# Patient Record
Sex: FEMALE | Race: WHITE | NOT HISPANIC OR LATINO | Employment: FULL TIME | URBAN - METROPOLITAN AREA
[De-identification: names, ages, dates, MRNs, and addresses within clinical notes are randomized per-mention and may not be internally consistent; named-entity substitution may affect disease eponyms.]

---

## 2024-04-24 ENCOUNTER — OFFICE VISIT (OUTPATIENT)
Dept: OBGYN CLINIC | Facility: CLINIC | Age: 29
End: 2024-04-24
Payer: COMMERCIAL

## 2024-04-24 VITALS
DIASTOLIC BLOOD PRESSURE: 74 MMHG | SYSTOLIC BLOOD PRESSURE: 114 MMHG | HEIGHT: 66 IN | WEIGHT: 147 LBS | BODY MASS INDEX: 23.63 KG/M2

## 2024-04-24 DIAGNOSIS — N97.9 INFERTILITY, FEMALE: ICD-10-CM

## 2024-04-24 DIAGNOSIS — Z01.419 ENCOUNTER FOR GYNECOLOGICAL EXAMINATION WITHOUT ABNORMAL FINDING: Primary | ICD-10-CM

## 2024-04-24 PROCEDURE — 99385 PREV VISIT NEW AGE 18-39: CPT | Performed by: NURSE PRACTITIONER

## 2024-04-24 NOTE — PROGRESS NOTES
"Assessment/Plan:  Reviewed normal cycles 21-45 days Attempting conception x 1 year Recent labs WNL recommend semen analysis for  Consult with DALTON info provided for [place] and [facility]. Discussed AP natural cycles. Cont ovulation predictor kits Basal temp check        Diagnoses and all orders for this visit:    Encounter for gynecological examination without abnormal finding    Infertility, female    Other orders  -     Liquid-based pap, screening          Subjective:      Patient ID: Fady Molina is a 29 y.o. female.    New pt here for annual gyn Last [place] 4/2023 PAP neg/neg HPV Labs done 3/2024 for fertility testing WNL Insulin slightly elevated. Had Kyleena x 5 years removed 3/2023 prev on OCP Periods with  Spotting starts day 21-27 days and bleeds 2-7 days.  Has used ovulation predictor kits with positive result.  has not had semen analysis Denies abd or pelvic pain  bowel and bladder are normal         The following portions of the patient's history were reviewed and updated as appropriate: allergies, current medications, past family history, past medical history, past social history, past surgical history, and problem list.    Review of Systems   Constitutional:  Negative for fatigue and unexpected weight change.   Gastrointestinal:  Negative for abdominal distention, abdominal pain, constipation and diarrhea.   Genitourinary:  Positive for menstrual problem. Negative for difficulty urinating, dyspareunia, dysuria, frequency, genital sores, pelvic pain, urgency, vaginal bleeding, vaginal discharge and vaginal pain.   Neurological:  Negative for headaches.   Psychiatric/Behavioral: Negative.  Negative for dysphoric mood. The patient is not nervous/anxious.          Objective:      /74 (BP Location: Right arm, Patient Position: Sitting, Cuff Size: Standard)   Ht 5' 5.5\" (1.664 m)   Wt 66.7 kg (147 lb)   LMP 04/07/2024   BMI 24.09 kg/m²          Physical Exam  Vitals and nursing " note reviewed.   Constitutional:       General: She is not in acute distress.     Appearance: Normal appearance.   HENT:      Head: Normocephalic and atraumatic.   Pulmonary:      Effort: Pulmonary effort is normal.   Chest:   Breasts:     Breasts are symmetrical.      Right: Normal. No mass, nipple discharge, skin change or tenderness.      Left: Normal. No mass, nipple discharge, skin change or tenderness.   Abdominal:      General: There is no distension.      Palpations: Abdomen is soft.      Tenderness: There is no abdominal tenderness. There is no guarding or rebound.   Genitourinary:     General: Normal vulva.      Exam position: Lithotomy position.      Labia:         Right: No rash, tenderness, lesion or injury.         Left: No rash, tenderness, lesion or injury.       Urethra: No prolapse, urethral pain, urethral swelling or urethral lesion.      Vagina: Normal. No erythema or lesions.      Cervix: No cervical motion tenderness, discharge, lesion or cervical bleeding.      Uterus: Normal.       Adnexa: Right adnexa normal and left adnexa normal.        Right: No mass or tenderness.          Left: No mass or tenderness.        Rectum: No mass or external hemorrhoid.   Musculoskeletal:         General: Normal range of motion.   Lymphadenopathy:      Upper Body:      Right upper body: No axillary adenopathy.      Left upper body: No axillary adenopathy.      Lower Body: No right inguinal adenopathy. No left inguinal adenopathy.   Skin:     General: Skin is warm and dry.   Neurological:      Mental Status: She is alert and oriented to person, place, and time.   Psychiatric:         Mood and Affect: Mood normal.         Behavior: Behavior normal.         Thought Content: Thought content normal.         Judgment: Judgment normal.

## 2024-05-02 NOTE — PATIENT INSTRUCTIONS
Reviewed normal cycles 21-45 days Attempting conception x 1 year Recent labs WNL recommend semen analysis for  Consult with DALTON info provided for [place] and [facility]. Discussed AP natural cycles. Cont ovulation predictor kits Basal temp check

## 2024-05-28 DIAGNOSIS — N92.6 IRREGULAR MENSES: Primary | ICD-10-CM

## 2024-05-28 DIAGNOSIS — N93.0 PCB (POST COITAL BLEEDING): ICD-10-CM

## 2024-07-02 ENCOUNTER — HOSPITAL ENCOUNTER (OUTPATIENT)
Dept: ULTRASOUND IMAGING | Facility: HOSPITAL | Age: 29
Discharge: HOME | End: 2024-07-02
Payer: COMMERCIAL

## 2024-07-02 DIAGNOSIS — N92.6 IRREGULAR MENSES: ICD-10-CM

## 2024-07-02 DIAGNOSIS — N93.0 PCB (POST COITAL BLEEDING): ICD-10-CM

## 2024-07-02 PROCEDURE — 76856 US EXAM PELVIC COMPLETE: CPT

## 2024-07-02 PROCEDURE — 76830 TRANSVAGINAL US NON-OB: CPT

## 2024-09-10 ENCOUNTER — ULTRASOUND (OUTPATIENT)
Dept: OBGYN CLINIC | Facility: CLINIC | Age: 29
End: 2024-09-10
Payer: COMMERCIAL

## 2024-09-10 VITALS
HEIGHT: 65 IN | DIASTOLIC BLOOD PRESSURE: 68 MMHG | WEIGHT: 150 LBS | BODY MASS INDEX: 24.99 KG/M2 | SYSTOLIC BLOOD PRESSURE: 110 MMHG

## 2024-09-10 DIAGNOSIS — Z3A.09 9 WEEKS GESTATION OF PREGNANCY: ICD-10-CM

## 2024-09-10 DIAGNOSIS — N91.2 AMENORRHEA: Primary | ICD-10-CM

## 2024-09-10 PROBLEM — N92.6 IRREGULAR MENSES: Status: ACTIVE | Noted: 2024-09-10

## 2024-09-10 PROBLEM — G43.109 MIGRAINE WITH AURA: Status: ACTIVE | Noted: 2024-09-10

## 2024-09-10 PROCEDURE — 99213 OFFICE O/P EST LOW 20 MIN: CPT | Performed by: PHYSICIAN ASSISTANT

## 2024-09-10 PROCEDURE — 76817 TRANSVAGINAL US OBSTETRIC: CPT | Performed by: PHYSICIAN ASSISTANT

## 2024-09-10 NOTE — PROGRESS NOTES
"Pregnancy Confirmation Visit  [facility] [place]  [address], [address], [place] [address]    Assessment/Plan:  29 y.o.  presenting with missed menses.  Viable pregnancy 9w2d by ultrasound today. Irregular menses 21-24 days apart. Will date based on ultrasound.   - Continue/start prenatal vitamin  - We reviewed her current medications and discussed which are safe to continue in pregnancy  - We briefly discussed options for aneuploidy screening, to be discussed further at the prenatal intake, MFM referral given.   - Schedule prenatal intake with RN and initial prenatal visit; prenatal labs will be ordered during the prenatal intake    Additional Pregnancy Problems Addressed today:   1. Amenorrhea  -     AMB US OB < 14 weeks single or first gestation level 1  2. 9 weeks gestation of pregnancy  -     Ambulatory Referral to Maternal Fetal Medicine; Future; Expected date: 2024        Subjective:    CC: Missed period    Fady Molina is a 29 y.o.  who presents with missed menses.  Patient's last menstrual period was 2024 (approximate)..    Patient notes that this pregnancy was planned and desired.  She was not using contraception at the time of conception. She reports she is certain of her LMP and that she has irregular menses, approximately q21-24 days. She has has no vaginal bleeding since her LMP.    Objective:  /68 (BP Location: Left arm, Patient Position: Sitting, Cuff Size: Standard)   Ht 5' 5\" (1.651 m)   Wt 68 kg (150 lb)   LMP 2024 (Approximate)   BMI 24.96 kg/m²     Physical Exam:  General: Well appearing, no distress  CV: Regular rate  Respiratory: Unlabored breathing  Abdomen: Soft, nontender  Extremities: Without edema  Mood and Affect: Appropriate    FIRST TRIMESTER OBSTETRIC ULTRASOUND  Date of study: 9/10/2024  Performed by: Vanda Sparks PA-C     INDICATION: Amenorrhea, viability    COMPARISON: None.     TECHNIQUE:   " Transvaginal imaging was performed to assess the gestation, myometrial/endometrial architecture and ovarian parenchymal detail.    The study includes volumetric sweeps and traditional still imaging technique.      FINDINGS:     A single intrauterine gestation is identified.  Cardiac activity is detected at 175.      YOLK SAC:  Present and normal in size and appearance.  MEAN CROWN RUMP LENGTH:  26.1 mm = 9 weeks 2 days   AMNIOTIC FLUID/SAC SHAPE:  Within expected normal range.     UTERUS/ADNEXA:   No adnexal mass or pathologic cyst.  No free fluid identified.     IMPRESSION:    Will assign dating based on ultrasound at today's encounter  Final JADA: 4/13/2025  Gestational age: 9w2d  Fetal cardiac activity detected.  No adnexal masses seen.    Vanda Sparks PA-C  9/10/2024 8:32 AM       Additional Findings: none     FHR: 175    Assigning a Final JADA  Patient's menses are irregular 21-24 days apart. Will date based on ultrasound at today's encounter.     Final JADA: 4/13/2025 by ultrasound at today's encounter.         Vanda Sparks PA-C  [facility] [address] [address]  Dept: [phone]  Dept Fax: [phone]  Loc Appt: [phone]  Loc: [phone]  Loc Fax: [phone]

## 2024-09-10 NOTE — PROGRESS NOTES
Ultrasound Probe Disinfection    A transvaginal ultrasound was performed.   Prior to use, disinfection was performed with High Level Disinfection Process ([employer])  Probe serial number [id number]:  [id number] was used    Carmina Sandoval  09/10/24  8:40 AM

## 2024-09-23 NOTE — PATIENT INSTRUCTIONS
Miles Molina,     It was so nice getting to know you today. Remember if you have an urgent or time sensitive concern, please call the practice phone number so a clinical triage team member can review your symptoms and get in touch with our on call provider if necessary. If you have general questions or need help navigating our services please REPLY to this message so it comes directly to me and I will respond between other patients. If I am out of the office for any reason, another nurse navigator may reach out to help you. Our Pregnancy Essential Guide is a great online resource--please use the link below.     [facility] Pregnancy Essentials Guide  [facility] ([email or web address])     Again, Congratulations and Thank You for choosing [facility]. I look forward to helping you through this journey.

## 2024-09-23 NOTE — PROGRESS NOTES
OB INTAKE INTERVIEW  Patient is 29 y.o. who presents for OB intake at 11.4wks  She is accompanied by  during this encounter  The father of her baby (Camacho) is involved in the pregnancy and is 32 years old.      Last Menstrual Period: 2024 (approximate)  Reports menstrual cycles are irregular, every 21-24 days apart   Ultrasound: Measured 9 weeks 2 days on 09/10/2024  Estimated Date of Delivery: 2024 based upon Ultrasound 9/10/24    Signs/Symptoms of Pregnancy  Current pregnancy symptoms:  Nausea: Try to eat 5-6 small meals a day, increase protein with meals/snacks, in order to keep stomach full at all times. Try bland foods like plain crackers, toast as well as carbonated drinks like gingerale. Hard peppermint or kush candy, is a natural treatment for nausea,  B-tahmina pops  with B6 ( available at the pharmacy), B6 25 mg in the AM and 25 mg in PM. May combine with Unisom 12.5 mg at night. Unisom may cause drowsiness.  High complex carbohydrate snack  before bedtime. If symptoms worsen, unable to keep fluids down without vomiting for more than 12 hours, contact the office.   Constipation no  Headaches YES, prior to pregnancy drinking 3-4 cups caffeine daily. Recommend Tylenol with caffeine PRN for first line headache treatment in pregnancy. Discussed may consider initiation of Riboflavin (VIT B2) 400 mg daily with Magnesium Oxide 400 mg daily for migraine prevention. Also encourage adequate hydration.     Cramping/spotting no  PICA cravings no    Diabetes-  There is no height or weight on file to calculate BMI.  If patient has 1 or more, please order early 1 hour GTT  History of GDM no  BMI >35 no  History of PCOS or current metformin use no  History of LGA/macrosomic infant (4000g/9lbs) no    If patient has 2 or more, please order early 1 hour GTT  BMI>30 no  AMA no  First degree relative with type 2 diabetes no  History of chronic HTN, hyperlipidemia, elevated A1C no  High risk race (  American, , ,  or ) no    Hypertension- if you answer yes to any of the following, please order baseline preeclampsia labs (cbc, comprehensive metabolic panel, urine protein creatinine ratio, uric acid)  History of of chronic HTN no  History of gestational HTN no  History of preeclampsia, eclampsia, or HELLP syndrome no  History of diabetes no  History of lupus, autoimmune disease, kidney disease no    Thyroid- if yes order TSH with reflex T4  History of thyroid disease no    Bleeding Disorder or Hx of DVT-patient or first degree relative with history of. Order the following if not done previously.   (Factor V, antithrombin III, prothrombin gene mutation, protein C and S Ag, lupus anticoagulant, anticardiolipin, beta-2 glycoprotein)   no    OB/GYN-  History of abnormal pap smear no       Date of last pap smear 2023  History of HPV no  History of Herpes/HSV no  History of other STI (gonorrhea, chlamydia, trich) no  History of prior  no  History of prior  no  History of  delivery prior to 36 weeks 6 days no  History of blood transfusion no  Ok for blood transfusion yes     Substance screening-   History of tobacco use no  Currently using tobacco no  Substance Use Screen Level No Risk     MRSA Screening-   Does the pt have a hx of MRSA? no    Immunizations:  Influenza vaccine given this seasonNO  Discussed Tdap vaccine yes  Discussed COVID Vaccine yes    Genetic/MFM-  Do you or your partner have a history of any of the following in yourselves or first degree relatives?  Cystic fibrosis no  Spinal muscular atrophy no  Hemoglobinopathy/Sickle Cell/Thalassemia no  Fragile X Intellectual Disability no    If yes, discuss Carrier Screening and recommend consultation with MFM/Genetic Counseling and place specific MFM Referral for.    If no, discuss Carrier Screening being completed once in a lifetime as a standard of care lab test. Place orders for Cystic  Fibrosis Gene Test (MSS021) and Spinal Muscular Atrophy DNA (NXP5926)  CF/SMA cost option sheet for [facility] provided, pt will verify coverage and call if she desires to have carrier screening     Appointment for Nuchal Translucency Ultrasound at [facility] scheduled for 10/2/2024      Interview education  [facility] Pregnancy Essentials Book reviewed, discussed and attached to their AVS yes      Ambulatory Referral to  placed  EPDS: 0    Prenatal lab work scripts YES  Preferred Lab: [facility]  Extra labs ordered:  none    Aspirin/Preeclampsia Screen    Risk Level Risk Factor Recommendation   LOW Prior Uncomplicated full-term delivery no No Aspirin recommendation        MODERATE Nulliparity YES Recommend low-dose aspirin if     BMI>30 no 2 or more moderate risk factors    Family History Preeclampsia (mother/sister) no     35yr old or greater no     Black Race, Concern for SDOH/Low Socioeconomic no     IVF Pregnancy  no     Personal History Risks (low birth weight, prior adverse preg outcome, >10yr preg interval) no         HIGH History of Preeclampsia no Recommend low-dose aspirin if     Multifetal gestation no 1 or more high risk factors    Chronic HTN no     Type 1 or 2 Diabetes no     Renal Disease no     Autoimmune Disease  no      Contraindications to ASA therapy:  NSAID/ ASA allergy: no  Nasal polyps: no  Asthma with history of ASA induced bronchospasm: no  Relative contraindications:  History of GI bleed: no  Active peptic ulcer disease: no  Severe hepatic dysfunction: no    Patient should be recommended to take ASA 162mg during this pregnancy from 12-36wks to lower her risk of preeclampsia:   Low Risk       The patient has a history now or in prior pregnancy notable for:  See Below      Details that I feel the provider should be aware of: Fady is a current patient to [facility]. This is her first pregnancy. This pregnancy was planned and desired.    Medical history includes :  Migraines  without aura-Relieved with Chiropractor adjusments   Denies any other past or current medical concerns.      *Has a cat-toxoplasmosis precautions provided     PN1 visit scheduled. The patient was oriented to our practice, the navigator role, reviewed delivering physicians and [facility] for Delivery. All questions were answered.    Interviewed by: MADELIN Gonzalez

## 2024-09-26 ENCOUNTER — PATIENT OUTREACH (OUTPATIENT)
Dept: OBGYN CLINIC | Facility: CLINIC | Age: 29
End: 2024-09-26

## 2024-09-26 ENCOUNTER — INITIAL PRENATAL (OUTPATIENT)
Dept: OBGYN CLINIC | Facility: CLINIC | Age: 29
End: 2024-09-26

## 2024-09-26 VITALS
WEIGHT: 150 LBS | HEIGHT: 65 IN | DIASTOLIC BLOOD PRESSURE: 82 MMHG | SYSTOLIC BLOOD PRESSURE: 122 MMHG | BODY MASS INDEX: 24.99 KG/M2

## 2024-09-26 DIAGNOSIS — Z36.89 ENCOUNTER FOR OTHER SPECIFIED ANTENATAL SCREENING: ICD-10-CM

## 2024-09-26 DIAGNOSIS — Z3A.11 11 WEEKS GESTATION OF PREGNANCY: Primary | ICD-10-CM

## 2024-09-26 PROCEDURE — OBC

## 2024-09-26 NOTE — PROGRESS NOTES
SW referred for initial prenatal assessment. Patient is , 35a0sHQ with an JADA of 25. Patient presented with FOB ( Camacho) today, agreeable to speaking with SW.    Patient reports this is a planned pregnancy. She and FOB both work and drive. She denies needing information for MA/WIC/SNAP, parenting education, or baby supply resources today. Expressed interest in ACP info - 5 Wishes sent via email.    Patient denies current or h/o mental health, substance use, DV/IPV, CYS involvement, and legal concerns. She has good support from FOB their parents. Enjoys watching TV, reading books, and exercising for stress relief.    No SW needs identified at this time, SW closing referral. Please re-refer as needed.

## 2024-09-27 ENCOUNTER — TELEPHONE (OUTPATIENT)
Age: 29
End: 2024-09-27

## 2024-09-27 DIAGNOSIS — Z31.430 ENCOUNTER OF FEMALE FOR TESTING FOR GENETIC DISEASE CARRIER STATUS FOR PROCREATIVE MANAGEMENT: Primary | ICD-10-CM

## 2024-09-27 LAB
EXTERNAL ANTIBODY SCREEN: NORMAL
EXTERNAL HEMATOCRIT: 40.3 %
EXTERNAL HEMOGLOBIN: 13.3 G/DL
EXTERNAL HEPATITIS B SURFACE ANTIGEN: NORMAL
EXTERNAL HIV-1/2 AB-AG: NORMAL
EXTERNAL PLATELET COUNT: 261 K/ÂΜL
EXTERNAL RH FACTOR: POSITIVE
EXTERNAL RUBELLA IGG QUANTITATION: NORMAL
EXTERNAL SYPHILIS TOTAL IGG/IGM SCREENING: NORMAL

## 2024-09-27 NOTE — TELEPHONE ENCOUNTER
Patient had called wanting to add a CF/SMA panel to her prenatal labs that she was already ordered. Patient uses [facility] and can be reached at [phone] with any questions or once the orders are placed.

## 2024-09-27 NOTE — TELEPHONE ENCOUNTER
CF/SMA screening orders placed. Spoke with patient and informed order have been placed and transmitted to [facility]. Pt informed she verified coverage with her insurance.

## 2024-09-29 LAB
ABO GROUP BLD: ABNORMAL
APPEARANCE UR: CLEAR
BACTERIA UR CULT: ABNORMAL
BACTERIA UR CULT: NO GROWTH
BACTERIA URNS QL MICRO: ABNORMAL
BASOPHILS # BLD AUTO: 0 X10E3/UL (ref 0–0.2)
BASOPHILS NFR BLD AUTO: 0 %
BILIRUB UR QL STRIP: NEGATIVE
BLD GP AB SCN SERPL QL: NEGATIVE
C TRACH RRNA SPEC QL NAA+PROBE: NEGATIVE
CASTS URNS QL MICRO: ABNORMAL /LPF
COLOR UR: YELLOW
EOSINOPHIL # BLD AUTO: 0.1 X10E3/UL (ref 0–0.4)
EOSINOPHIL NFR BLD AUTO: 2 %
EPI CELLS #/AREA URNS HPF: ABNORMAL /HPF (ref 0–10)
ERYTHROCYTE [DISTWIDTH] IN BLOOD BY AUTOMATED COUNT: 12.1 % (ref 11.7–15.4)
GLUCOSE UR QL: NEGATIVE
HBV SURFACE AG SERPL QL IA: NEGATIVE
HCT VFR BLD AUTO: 40.3 % (ref 34–46.6)
HCV AB S/CO SERPL IA: NON REACTIVE
HGB BLD-MCNC: 13.3 G/DL (ref 11.1–15.9)
HGB UR QL STRIP: NEGATIVE
HIV 1+2 AB+HIV1 P24 AG SERPL QL IA: NON REACTIVE
IMM GRANULOCYTES # BLD: 0 X10E3/UL (ref 0–0.1)
IMM GRANULOCYTES NFR BLD: 0 %
KETONES UR QL STRIP: NEGATIVE
LEUKOCYTE ESTERASE UR QL STRIP: ABNORMAL
LYMPHOCYTES # BLD AUTO: 1.8 X10E3/UL (ref 0.7–3.1)
LYMPHOCYTES NFR BLD AUTO: 20 %
MCH RBC QN AUTO: 29.8 PG (ref 26.6–33)
MCHC RBC AUTO-ENTMCNC: 33 G/DL (ref 31.5–35.7)
MCV RBC AUTO: 90 FL (ref 79–97)
MICRO URNS: ABNORMAL
MONOCYTES # BLD AUTO: 0.5 X10E3/UL (ref 0.1–0.9)
MONOCYTES NFR BLD AUTO: 6 %
N GONORRHOEA RRNA SPEC QL NAA+PROBE: NEGATIVE
NEUTROPHILS # BLD AUTO: 6.5 X10E3/UL (ref 1.4–7)
NEUTROPHILS NFR BLD AUTO: 72 %
NITRITE UR QL STRIP: NEGATIVE
PH UR STRIP: 5.5 [PH] (ref 5–7.5)
PLATELET # BLD AUTO: 261 X10E3/UL (ref 150–450)
PROT UR QL STRIP: NEGATIVE
RBC # BLD AUTO: 4.47 X10E6/UL (ref 3.77–5.28)
RBC #/AREA URNS HPF: ABNORMAL /HPF (ref 0–2)
RH BLD: POSITIVE
RPR SER QL: NON REACTIVE
RUBV IGG SERPL IA-ACNC: 3.2 INDEX
SL AMB INTERPRETATION: NORMAL
SP GR UR: 1.01 (ref 1–1.03)
UROBILINOGEN UR STRIP-ACNC: 0.2 MG/DL (ref 0.2–1)
WBC # BLD AUTO: 8.9 X10E3/UL (ref 3.4–10.8)
WBC #/AREA URNS HPF: ABNORMAL /HPF (ref 0–5)

## 2024-10-01 NOTE — PROGRESS NOTES
Please refer to the [facility] ultrasound report in Ob Procedures for additional information regarding today's visit

## 2024-10-02 ENCOUNTER — ROUTINE PRENATAL (OUTPATIENT)
Dept: PERINATAL CARE | Facility: OTHER | Age: 29
End: 2024-10-02
Payer: COMMERCIAL

## 2024-10-02 ENCOUNTER — INITIAL PRENATAL (OUTPATIENT)
Dept: OBGYN CLINIC | Facility: CLINIC | Age: 29
End: 2024-10-02
Payer: COMMERCIAL

## 2024-10-02 VITALS
BODY MASS INDEX: 24.56 KG/M2 | DIASTOLIC BLOOD PRESSURE: 76 MMHG | HEIGHT: 65 IN | SYSTOLIC BLOOD PRESSURE: 120 MMHG | WEIGHT: 147.4 LBS

## 2024-10-02 VITALS
BODY MASS INDEX: 24.69 KG/M2 | DIASTOLIC BLOOD PRESSURE: 78 MMHG | HEIGHT: 65 IN | WEIGHT: 148.2 LBS | SYSTOLIC BLOOD PRESSURE: 126 MMHG | HEART RATE: 100 BPM

## 2024-10-02 DIAGNOSIS — Z33.1 PREGNANT STATE, INCIDENTAL: ICD-10-CM

## 2024-10-02 DIAGNOSIS — Z3A.09 9 WEEKS GESTATION OF PREGNANCY: ICD-10-CM

## 2024-10-02 DIAGNOSIS — Z36.82 ENCOUNTER FOR ANTENATAL SCREENING FOR NUCHAL TRANSLUCENCY: Primary | ICD-10-CM

## 2024-10-02 DIAGNOSIS — Z36.0 ENCOUNTER FOR ANTENATAL SCREENING FOR CHROMOSOMAL ANOMALIES: ICD-10-CM

## 2024-10-02 DIAGNOSIS — Z3A.12 12 WEEKS GESTATION OF PREGNANCY: ICD-10-CM

## 2024-10-02 DIAGNOSIS — Z11.3 SCREEN FOR STD (SEXUALLY TRANSMITTED DISEASE): ICD-10-CM

## 2024-10-02 DIAGNOSIS — Z3A.12 12 WEEKS GESTATION OF PREGNANCY: Primary | ICD-10-CM

## 2024-10-02 LAB
EXTERNAL CHLAMYDIA SCREEN: NEGATIVE
EXTERNAL GONORRHEA SCREEN: NEGATIVE
SL AMB  POCT GLUCOSE, UA: NORMAL
SL AMB POCT URINE PROTEIN: NORMAL

## 2024-10-02 PROCEDURE — 76813 OB US NUCHAL MEAS 1 GEST: CPT | Performed by: OBSTETRICS & GYNECOLOGY

## 2024-10-02 PROCEDURE — 76801 OB US < 14 WKS SINGLE FETUS: CPT | Performed by: OBSTETRICS & GYNECOLOGY

## 2024-10-02 PROCEDURE — 99203 OFFICE O/P NEW LOW 30 MIN: CPT | Performed by: OBSTETRICS & GYNECOLOGY

## 2024-10-02 PROCEDURE — 36415 COLL VENOUS BLD VENIPUNCTURE: CPT | Performed by: OBSTETRICS & GYNECOLOGY

## 2024-10-02 PROCEDURE — PNV: Performed by: OBSTETRICS & GYNECOLOGY

## 2024-10-02 PROCEDURE — 81002 URINALYSIS NONAUTO W/O SCOPE: CPT | Performed by: OBSTETRICS & GYNECOLOGY

## 2024-10-02 NOTE — PROGRESS NOTES
First OB Visit  [facility] - [address], [address], [place] [address]    Assessment/Plan:  Fady is a 29 y.o. year old  at 12w3d who presents for initial prenatal visit.   Final JADA: 2025, by Ultrasound      Supervision of normal pregnancy  - Aneuploidy screening discussed.  Patient opts for sequential aneuploidy screening.  - Routine cervical cancer screening: Pap Done today.  - Routine STI Screening: GC/Chlamydia sent today.  HIV/Hep B/Hep C/Syphilis ordered in prenatal panel.  - Patient Education: Patient was counseled regarding diet, exercise, weight gain, foods to avoid, vaccines in pregnancy, aneuploidy screening, travel precautions to include seat belt use and VTE risk reduction.  She has been provided our pregnancy packet which includes how and when to contact providers, medication recommendations, dietary suggestions, breastfeeding information as well as websites for additional information, hospital and delivery concerns.         Additional Pregnancy Problems:   1. 12 weeks gestation of pregnancy  -     POCT urine dip  2. Screen for STD (sexually transmitted disease)  -     Chlamydia/GC EDGAR, Confirmation      MFM referral given for early anatomy and aneuploidy screening, due 11-13 weeks.    Next OB visit: 4 wks    Subjective:   CC:  Desires prenatal care  Fady Molina is a 29 y.o.  female who presents for prenatal care.  Patient's last menstrual period was 2024 (approximate).  Which would make her 12 weeks and 3 days pregnant today.    Pregnancy ROS: no leakage of fluid, pelvic pain, or vaginal bleeding.  no nausea/vomiting.    OB History    Para Term  AB Living   1 0 0 0 0 0   SAB IAB Ectopic Multiple Live Births   0 0 0 0 0      # Outcome Date GA Lbr Joaquin/2nd Weight Sex Type Anes PTL Lv   1 Current                The following portions of the patient's history were reviewed and updated as appropriate: She  has a past medical  "history of Migraines.  She  has a past surgical history that includes [place] tooth extraction.  Her family history includes Lung cancer in her maternal grandmother.  She  reports that she has never smoked. She has never been exposed to tobacco smoke. She has never used smokeless tobacco. She reports that she does not currently use alcohol. She reports that she does not use drugs.  Current Outpatient Medications   Medication Sig Dispense Refill    Vyhadn-Qsazwqypx-Cste-Fish Oil (PRENATAL + COMPLETE MULTI PO) Take by mouth       No current facility-administered medications for this visit.     She is allergic to pollen extract..      Objective:  /76 (BP Location: Left arm, Patient Position: Sitting, Cuff Size: Standard)   Ht 5' 5\" (1.651 m)   Wt 66.9 kg (147 lb 6.4 oz)   LMP 2024 (Approximate)   BMI 24.53 kg/m²   Pregravid Weight/BMI: 65.8 kg (145 lb) (BMI 24.13)  Current Weight: 66.9 kg (147 lb 6.4 oz)   Total Weight Gain: 1.089 kg (2 lb 6.4 oz)   Pre- Vitals      Flowsheet Row Most Recent Value   Prenatal Assessment    Fetal Heart Rate 150   Fundal Height (cm) 12 cm   Prenatal Vitals    Blood Pressure 120/76   Weight - Scale 66.9 kg (147 lb 6.4 oz)   Urine Albumin/Glucose    Dilation/Effacement/Station    Cervical Dilation 0   Cervical Effacement 0   Fetal Station -5   Vaginal Drainage    Draining Fluid No   Edema            General: Well appearing, no distress  Breasts: Normal bilaterally, nontender without masses, asymmetry, or nipple discharge.  Abdomen: Soft, gravid, nontender  : Urethra normal. Normal labia majora and minora. Vagina normal.  No vaginal bleeding. No vaginal discharge. Cervix closed. Uterus non-tender.  Extremities: Warm and well perfused.  Non tender.  No edema.    Ultrasound: ultrasound confirmed SLIUP, see report for details  "

## 2024-10-02 NOTE — PROGRESS NOTES
Patient chose to have [facility] [id number] Non-Invasive Prenatal Screen [id number] PLUS w/ SCA, WITH fetal sex.  Patient choose to be billed through insurance.     Patient given brochure and is aware [facility] will contact patient's insurance and coordinate coverage.  Provided [facility] contact information. General inquiries [phone], Cost estimates [phone] and [facility] Billing [phone]. Website [email or web address].     Blood collection tubes labeled with patient identifiers (name, medical record number, and date of birth).     Filled out [facility] order form. Patient chose to have blood drawn in our office at time of visit. NIPS was drawn from right arm with a butterfly needle by MEERA Vasquez MA. .      If patient chose to have blood work drawn at a [facility] lab we requested patient notify MFM (via phone call or [employer] message) when blood collected so office can follow up on results.       Maternal Fetal Medicine will have results in approximately 5-7 business days and will call patient or notify via [employer].  Patient aware viewing lab result online will reveal fetal sex if ordered.    Patient verbalized understanding of all instructions and no questions at this time.

## 2024-10-06 LAB
CFDNA.FET/CFDNA.TOTAL SFR FETUS: NORMAL %
CITATION REF LAB TEST: NORMAL
FET 13+18+21+X+Y ANEUP PLAS.CFDNA: NEGATIVE
FET CHR 21 TS PLAS.CFDNA QL: NEGATIVE
FET CHR 21 TS PLAS.CFDNA QL: NEGATIVE
FET MS X RISK WBC.DNA+CFDNA QL: NOT DETECTED
FET SEX PLAS.CFDNA DOSAGE CFDNA: NORMAL
FET TS 13 RISK PLAS.CFDNA QL: NEGATIVE
FET X + Y ANEUP RISK PLAS.CFDNA SEQ-IMP: NOT DETECTED
GA EST FROM CONCEPTION DATE: NORMAL D
GESTATIONAL AGE > 9:: YES
LAB DIRECTOR NAME PROVIDER: NORMAL
LAB DIRECTOR NAME PROVIDER: NORMAL
LABORATORY COMMENT REPORT: NORMAL
LIMITATIONS OF THE TEST: NORMAL
NEGATIVE PREDICTIVE VALUE: NORMAL
PERFORMANCE CHARACTERISTICS: NORMAL
POSITIVE PREDICTIVE VALUE: NORMAL
REF LAB TEST METHOD: NORMAL
SL AMB NOTE:: NORMAL
TEST PERFORMANCE INFO SPEC: NORMAL

## 2024-10-07 LAB
C TRACH RRNA SPEC QL NAA+PROBE: NEGATIVE
N GONORRHOEA RRNA SPEC QL NAA+PROBE: NEGATIVE

## 2024-10-16 LAB
CITATION REF LAB TEST: NORMAL
ETHNIC BACKGROUND STATED: NORMAL
GENE DIS ANL CARRIER INTERP-IMP: NORMAL
GENE STUDIED ID: NORMAL
LAB DIRECTOR NAME PROVIDER: NORMAL
LABORATORY COMMENT REPORT: NORMAL
Lab: NORMAL
REASON FOR REFERRAL (NARRATIVE): NORMAL
RECOMMENDATION PATIENT DOC-IMP: NORMAL
REF LAB TEST METHOD: NORMAL
SMN1 GENE MUT ANL BLD/T: NORMAL
SPECIMEN PREPARATION: NORMAL

## 2024-10-29 ENCOUNTER — ROUTINE PRENATAL (OUTPATIENT)
Dept: OBGYN CLINIC | Facility: CLINIC | Age: 29
End: 2024-10-29
Payer: COMMERCIAL

## 2024-10-29 VITALS
BODY MASS INDEX: 25.49 KG/M2 | HEIGHT: 65 IN | DIASTOLIC BLOOD PRESSURE: 76 MMHG | SYSTOLIC BLOOD PRESSURE: 114 MMHG | WEIGHT: 153 LBS

## 2024-10-29 DIAGNOSIS — Z3A.16 16 WEEKS GESTATION OF PREGNANCY: ICD-10-CM

## 2024-10-29 DIAGNOSIS — Z36.1 NEED FOR MATERNAL SERUM ALPHA-PROTEIN (MSAFP) SCREENING: ICD-10-CM

## 2024-10-29 DIAGNOSIS — R51.9 PREGNANCY HEADACHE, ANTEPARTUM: Primary | ICD-10-CM

## 2024-10-29 DIAGNOSIS — O26.899 PREGNANCY HEADACHE, ANTEPARTUM: Primary | ICD-10-CM

## 2024-10-29 LAB
SL AMB  POCT GLUCOSE, UA: NORMAL
SL AMB POCT URINE PROTEIN: NORMAL

## 2024-10-29 PROCEDURE — PNV: Performed by: PHYSICIAN ASSISTANT

## 2024-10-29 PROCEDURE — 81002 URINALYSIS NONAUTO W/O SCOPE: CPT | Performed by: PHYSICIAN ASSISTANT

## 2024-10-29 NOTE — PROGRESS NOTES
"Routine Prenatal Visit  [facility] [place]  [address], [address], [place] [address]    Assessment/Plan:  Fady is a 29 y.o. year old  at 16w2d who presents for routine prenatal visit.     1. Pregnancy headache, antepartum  Assessment & Plan:  Reviewed headaches, recommend Tylenol, rest, hydration. Reviewed Magnesium 250-400mg & Riboflavin 400mg daily.       2. 16 weeks gestation of pregnancy  Assessment & Plan:  Reviewed AFP to evaluate for ONTD. Script given. Aware to have done between 15-18 weeks. Aware to check insurance for coverage prior to having one.   Has Level II ultrasound scheduled.   Continue routine prenatal care.   Return to office for ob check in 4 weeks.       Orders:  -     POCT urine dip  -     Alpha fetoprotein, maternal; Future  -     Alpha fetoprotein, maternal  3. Need for maternal serum alpha-protein (MSAFP) screening  -     Alpha fetoprotein, maternal; Future  -     Alpha fetoprotein, maternal      Next OB Visit 4 weeks.    Subjective:     CC: Prenatal care    Fady Molina is a 29 y.o.  female who presents for routine prenatal care at 16w2d.  Pregnancy ROS:Reports headaches, hx of migraines.  No FM, N/V, cramping, VB, LOF, edema, domestic violence, or smoking. Tolerating PNV.        The following portions of the patient's history were reviewed and updated as appropriate: allergies, current medications, past family history, past medical history, obstetric history, gynecologic history, past social history, past surgical history and problem list.      Objective:  /76 (BP Location: Left arm, Patient Position: Sitting, Cuff Size: Standard)   Ht 5' 5\" (1.651 m)   Wt 69.4 kg (153 lb)   LMP 2024 (Approximate)   BMI 25.46 kg/m²   Pregravid Weight/BMI: 65.8 kg (145 lb) (BMI 24.13)  Current Weight: 69.4 kg (153 lb)   Total Weight Gain: 3.629 kg (8 lb)   Pre- Vitals      Flowsheet Row Most Recent Value   Prenatal Assessment    Fetal " Heart Rate 153   Fundal Height (cm) 16 cm   Movement Absent   Prenatal Vitals    Blood Pressure 114/76   Weight - Scale 69.4 kg (153 lb)   Urine Albumin/Glucose    Dilation/Effacement/Station    Vaginal Drainage    Edema    LLE Edema None   RLE Edema None   Facial Edema None             General: Well appearing, no distress  Abdomen: Soft, gravid, nontender  Fundal Height: Appropriate for gestational age.  Extremities: Warm and well perfused.  Non tender.

## 2024-10-29 NOTE — ASSESSMENT & PLAN NOTE
Reviewed AFP to evaluate for ONTD. Script given. Aware to have done between 15-18 weeks. Aware to check insurance for coverage prior to having one.   Has Level II ultrasound scheduled.   Continue routine prenatal care.   Return to office for ob check in 4 weeks.

## 2024-10-29 NOTE — ASSESSMENT & PLAN NOTE
Reviewed headaches, recommend Tylenol, rest, hydration. Reviewed Magnesium 250-400mg & Riboflavin 400mg daily.

## 2024-11-22 ENCOUNTER — TELEPHONE (OUTPATIENT)
Dept: OBGYN CLINIC | Facility: CLINIC | Age: 29
End: 2024-11-22

## 2024-11-27 ENCOUNTER — ROUTINE PRENATAL (OUTPATIENT)
Dept: OBGYN CLINIC | Facility: CLINIC | Age: 29
End: 2024-11-27
Payer: COMMERCIAL

## 2024-11-27 VITALS
DIASTOLIC BLOOD PRESSURE: 74 MMHG | BODY MASS INDEX: 26.63 KG/M2 | SYSTOLIC BLOOD PRESSURE: 112 MMHG | HEIGHT: 64 IN | WEIGHT: 156 LBS

## 2024-11-27 DIAGNOSIS — Z34.02 ENCOUNTER FOR SUPERVISION OF NORMAL FIRST PREGNANCY IN SECOND TRIMESTER: Primary | ICD-10-CM

## 2024-11-27 DIAGNOSIS — Z3A.20 20 WEEKS GESTATION OF PREGNANCY: ICD-10-CM

## 2024-11-27 LAB
SL AMB  POCT GLUCOSE, UA: NORMAL
SL AMB POCT URINE PROTEIN: NORMAL

## 2024-11-27 PROCEDURE — 81002 URINALYSIS NONAUTO W/O SCOPE: CPT | Performed by: OBSTETRICS & GYNECOLOGY

## 2024-11-27 PROCEDURE — PNV: Performed by: OBSTETRICS & GYNECOLOGY

## 2024-11-27 NOTE — ASSESSMENT & PLAN NOTE
Has not gone for AFP. Explained rationale and she will consider going. Explained time frame for valid testing. Anatomy U/S scheduled for Friday

## 2024-11-27 NOTE — PROGRESS NOTES
"Routine Prenatal Visit  [facility] [address], [address], [place] [address]    Assessment/Plan:  Fady is a 29 y.o. year old  at 20w3d who presents for routine prenatal visit.     1. Encounter for supervision of normal first pregnancy in second trimester  2. 20 weeks gestation of pregnancy  Assessment & Plan:  Has not gone for AFP. Explained rationale and she will consider going. Explained time frame for valid testing. Anatomy U/S scheduled for Friday  Orders:  -     POCT urine dip        Subjective:     CC: Prenatal care    Fady Molina is a 29 y.o.  female who presents for routine prenatal care at 20w3d.  Pregnancy ROS: no leakage of fluid, pelvic pain, or vaginal bleeding.  normal fetal movement.    The following portions of the patient's history were reviewed and updated as appropriate: allergies, current medications, past family history, past medical history, obstetric history, gynecologic history, past social history, past surgical history and problem list.      Objective:  /74 (BP Location: Left arm, Patient Position: Sitting, Cuff Size: Standard)   Ht 5' 4\" (1.626 m)   Wt 70.8 kg (156 lb)   LMP 2024 (Approximate)   BMI 26.78 kg/m²   Pregravid Weight/BMI: 65.8 kg (145 lb) (BMI 24.88)  Current Weight: 70.8 kg (156 lb)   Total Weight Gain: 4.99 kg (11 lb)   Pre-Tracie Vitals      Flowsheet Row Most Recent Value   Prenatal Assessment    Fetal Heart Rate 145   Fundal Height (cm) 20 cm   Movement Present   Prenatal Vitals    Blood Pressure 112/74   Weight - Scale 70.8 kg (156 lb)   Urine Albumin/Glucose    Dilation/Effacement/Station    Vaginal Drainage    Edema              General: Well appearing, no distress  Respiratory: Unlabored breathing  Cardiovascular: Regular rate.  Abdomen: Soft, gravid, nontender  Fundal Height: Appropriate for gestational age.  Extremities: Warm and well perfused.  Non tender.  "

## 2024-11-29 ENCOUNTER — ROUTINE PRENATAL (OUTPATIENT)
Dept: PERINATAL CARE | Facility: OTHER | Age: 29
End: 2024-11-29
Payer: COMMERCIAL

## 2024-11-29 VITALS
HEIGHT: 64 IN | WEIGHT: 156 LBS | DIASTOLIC BLOOD PRESSURE: 84 MMHG | BODY MASS INDEX: 26.63 KG/M2 | SYSTOLIC BLOOD PRESSURE: 118 MMHG | HEART RATE: 82 BPM

## 2024-11-29 DIAGNOSIS — O43.122 VELAMENTOUS INSERTION OF UMBILICAL CORD IN SECOND TRIMESTER: Primary | ICD-10-CM

## 2024-11-29 DIAGNOSIS — Z3A.20 20 WEEKS GESTATION OF PREGNANCY: ICD-10-CM

## 2024-11-29 DIAGNOSIS — Z36.86 ENCOUNTER FOR ANTENATAL SCREENING FOR CERVICAL LENGTH: ICD-10-CM

## 2024-11-29 PROCEDURE — 76817 TRANSVAGINAL US OBSTETRIC: CPT | Performed by: OBSTETRICS & GYNECOLOGY

## 2024-11-29 PROCEDURE — 76811 OB US DETAILED SNGL FETUS: CPT | Performed by: OBSTETRICS & GYNECOLOGY

## 2024-11-29 PROCEDURE — 99213 OFFICE O/P EST LOW 20 MIN: CPT | Performed by: OBSTETRICS & GYNECOLOGY

## 2024-11-29 NOTE — PROGRESS NOTES
Ultrasound Probe Disinfection    A transvaginal ultrasound was performed.   Prior to use, disinfection was performed with High Level Disinfection Process ([email or web address]).  Probe serial number [id number] was used.    Annabelle Davis  11/29/24  2:37 PM

## 2024-11-29 NOTE — LETTER
November 29, 2024     Vanda Sparks PA-C  [address]  [address]  [facility] [address]    Patient: Fady Molina   YOB: 1995   Date of Visit: 11/29/2024       Dear Dr. Sparks:    Thank you for referring Fady Molina to me for evaluation. Below are my notes for this consultation.    If you have questions, please do not hesitate to call me. I look forward to following your patient along with you.         Sincerely,        Maggie Whaley MD        CC: No Recipients    Maggie Whaley MD  11/29/2024  4:59 PM  Sign when Signing Visit  Fady Molina  has no complaints today at 20w5d. She does not report any vaginal bleeding or signs of labor.  Her recently completed fetal testing revealed a normal NIPT.  MSAFP was completed today and is pending. She is here today for an ultrasound for fetal anatomy.    Problem list:  Velamentous placental cord insertion    Ultrasound findings:  The ultrasound today shows normal interval fetal growth and fluid, normal cervical length, and no malformations were detected.  Baby's hand was in the view of the profile which made the views slightly limited but we were able to see a normal chin and normal nasal bone.  The velamentous cord insertion is confirmed and measures about 5 cm from the margin of the placenta.  There is no evidence for a vasa previa.    Pregnancy ultrasound has limitations and is unable to detect all forms of fetal congenital abnormalities.      Specific counseling was provided on the following problems:  Velamentous cord insertion is defined as a placental cord insertion that inserts into the fetal membranes and then the fetal vessels traverses unprotected till it reaches the fetal placenta. This is largely considered an anatomic variant. It is relatively common with an incidence of approximately 1%. We discussed that this has a weak association with fetal growth restriction for which third-trimester growth  ultrasounds are typically recommended.  Recommend also weekly nonstress test and fluid scans from 36 weeks on.  Nonstress test may need to start earlier than 36 weeks if future ultrasounds show any evidence for fetal growth lag.  During delivery sometimes patients with a velamentous cord insertion may have recurrent variable decelerations found during labor that may require a  for the safety of the baby.    Follow up recommended:   Recommended 28-week and 34 week ultrasound for growth.  Will schedule her 28-week ultrasound for growth after todays visit.    Pre visit time reviewing her records   3 minutes  Face to face time 10 minutes  Post visit time on documentation of note, updating her problem list, adding orders and prescriptions 15 minutes.  Procedures that were completed today were charged separately.   The level of decision making was low level complexity.    Maggie Whaley MD

## 2024-11-29 NOTE — PROGRESS NOTES
Fady Molina  has no complaints today at 20w5d. She does not report any vaginal bleeding or signs of labor.  Her recently completed fetal testing revealed a normal NIPT.  MSAFP was completed today and is pending. She is here today for an ultrasound for fetal anatomy.    Problem list:  Velamentous placental cord insertion    Ultrasound findings:  The ultrasound today shows normal interval fetal growth and fluid, normal cervical length, and no malformations were detected.  Baby's hand was in the view of the profile which made the views slightly limited but we were able to see a normal chin and normal nasal bone.  The velamentous cord insertion is confirmed and measures about 5 cm from the margin of the placenta.  There is no evidence for a vasa previa.    Pregnancy ultrasound has limitations and is unable to detect all forms of fetal congenital abnormalities.      Specific counseling was provided on the following problems:  Velamentous cord insertion is defined as a placental cord insertion that inserts into the fetal membranes and then the fetal vessels traverses unprotected till it reaches the fetal placenta. This is largely considered an anatomic variant. It is relatively common with an incidence of approximately 1%. We discussed that this has a weak association with fetal growth restriction for which third-trimester growth ultrasounds are typically recommended.  Recommend also weekly nonstress test and fluid scans from 36 weeks on.  Nonstress test may need to start earlier than 36 weeks if future ultrasounds show any evidence for fetal growth lag.  During delivery sometimes patients with a velamentous cord insertion may have recurrent variable decelerations found during labor that may require a  for the safety of the baby.    Follow up recommended:   Recommended 28-week and 34 week ultrasound for growth.  Will schedule her 28-week ultrasound for growth after todays visit.    Pre visit time  reviewing her records   3 minutes  Face to face time 10 minutes  Post visit time on documentation of note, updating her problem list, adding orders and prescriptions 15 minutes.  Procedures that were completed today were charged separately.   The level of decision making was low level complexity.    Maggie Whaley MD

## 2024-12-01 LAB
2ND TRIMESTER 4 SCREEN SERPL-IMP: NORMAL
AFP ADJ MOM SERPL: 0.74
AFP INTERP AMN-IMP: NORMAL
AFP INTERP SERPL-IMP: NORMAL
AFP INTERP SERPL-IMP: NORMAL
AFP SERPL-MCNC: 44.7 NG/ML
AGE AT DELIVERY: 32.2 YR
GA METHOD: NORMAL
GA: 20.7 WEEKS
IDDM PATIENT QL: NO
MULTIPLE PREGNANCY: NO
NEURAL TUBE DEFECT RISK FETUS: NORMAL %

## 2024-12-02 ENCOUNTER — RESULTS FOLLOW-UP (OUTPATIENT)
Dept: OBGYN CLINIC | Facility: CLINIC | Age: 29
End: 2024-12-02

## 2024-12-20 ENCOUNTER — TELEPHONE (OUTPATIENT)
Dept: OBGYN CLINIC | Facility: CLINIC | Age: 29
End: 2024-12-20

## 2024-12-20 NOTE — TELEPHONE ENCOUNTER
Second Trimester call -Voicemail received, left a message for patient to call back and sent message through [email or web address].     Overall how are you doing?     Compliant with routine OB care appointments? yes  Has ON appointment on 12/24/24  Have you completed your 1st trimester labs? yes    If you had NIPS with MFM, do you have a order for MSAFP?   Done on 11/29/24   Can be completed 15w-22w9d, ideally 16w-18w    Have you seen MFM and do you have your detailed US scheduled? 11/29/24    Pregnancy Education-have you had a chance to review the classes offered and registered? Registered for Prepared Childbirth class 02/03/28

## 2024-12-23 PROBLEM — Z3A.24 24 WEEKS GESTATION OF PREGNANCY: Status: ACTIVE | Noted: 2024-10-29

## 2024-12-24 ENCOUNTER — ROUTINE PRENATAL (OUTPATIENT)
Dept: OBGYN CLINIC | Facility: CLINIC | Age: 29
End: 2024-12-24
Payer: COMMERCIAL

## 2024-12-24 VITALS
WEIGHT: 160 LBS | HEIGHT: 64 IN | DIASTOLIC BLOOD PRESSURE: 80 MMHG | SYSTOLIC BLOOD PRESSURE: 120 MMHG | BODY MASS INDEX: 27.31 KG/M2

## 2024-12-24 DIAGNOSIS — Z3A.24 24 WEEKS GESTATION OF PREGNANCY: ICD-10-CM

## 2024-12-24 DIAGNOSIS — Z36.89 ENCOUNTER FOR OTHER SPECIFIED ANTENATAL SCREENING: ICD-10-CM

## 2024-12-24 DIAGNOSIS — O43.122 VELAMENTOUS INSERTION OF UMBILICAL CORD IN SECOND TRIMESTER: Primary | ICD-10-CM

## 2024-12-24 LAB
SL AMB  POCT GLUCOSE, UA: NORMAL
SL AMB POCT URINE PROTEIN: NORMAL

## 2024-12-24 PROCEDURE — 81002 URINALYSIS NONAUTO W/O SCOPE: CPT | Performed by: PHYSICIAN ASSISTANT

## 2024-12-24 PROCEDURE — PNV: Performed by: PHYSICIAN ASSISTANT

## 2024-12-24 NOTE — PROGRESS NOTES
"Routine Prenatal Visit  [facility] [address], [address], [place] [address]    Assessment/Plan:  Fady is a 29 y.o. year old  at 24w1d who presents for routine prenatal visit.     1. Velamentous insertion of umbilical cord in second trimester  Assessment & Plan:  3rd trimester growth ultrasound scheduled.   Weekly APFS at 36 weeks.   2. 24 weeks gestation of pregnancy  Assessment & Plan:  Script for 28 week labs given.   Continue routine prenatal care.   Return to office for ob check in 4 weeks.     Orders:  -     POCT urine dip  3. Encounter for other specified  screening  -     Glucose, 1H PG; Future  -     CBC; Future  -     RPR, Rfx Qn RPR/Confirm TP; Future  -     Glucose, 1H PG  -     CBC  -     RPR, Rfx Qn RPR/Confirm TP      Next OB Visit 4 weeks.    Subjective:     CC: Prenatal care    Fady Molina is a 29 y.o.  female who presents for routine prenatal care at 24w1d.  Pregnancy ROS: Good Fm, No N/V, HA, cramping, VB, LOF, edema, domestic violence, or smoking. Tolerating PNV.        The following portions of the patient's history were reviewed and updated as appropriate: allergies, current medications, past family history, past medical history, obstetric history, gynecologic history, past social history, past surgical history and problem list.      Objective:  /80 (BP Location: Right arm, Patient Position: Sitting, Cuff Size: Standard)   Ht 5' 4\" (1.626 m)   Wt 72.6 kg (160 lb)   LMP 2024 (Approximate)   BMI 27.46 kg/m²   Pregravid Weight/BMI: 65.8 kg (145 lb) (BMI 24.88)  Current Weight: 72.6 kg (160 lb)   Total Weight Gain: 6.804 kg (15 lb)   Pre-Tracie Vitals      Flowsheet Row Most Recent Value   Prenatal Assessment    Fetal Heart Rate 150   Fundal Height (cm) 24 cm   Movement Present   Prenatal Vitals    Blood Pressure 120/80   Weight - Scale 72.6 kg (160 lb)   Urine Albumin/Glucose    Dilation/Effacement/Station    Vaginal " Drainage    Edema    LLE Edema None   RLE Edema None   Facial Edema None             General: Well appearing, no distress  Abdomen: Soft, gravid, nontender  Fundal Height: Appropriate for gestational age.  Extremities: Warm and well perfused.  Non tender.

## 2024-12-24 NOTE — ASSESSMENT & PLAN NOTE
Script for 28 week labs given.   Continue routine prenatal care.   Return to office for ob check in 4 weeks.

## 2025-01-19 PROBLEM — Z3A.28 28 WEEKS GESTATION OF PREGNANCY: Status: ACTIVE | Noted: 2024-10-29

## 2025-01-20 LAB
EXTERNAL HEMOGLOBIN: 12.3 G/DL
EXTERNAL PLATELET COUNT: 235 K/ÂΜL
EXTERNAL SYPHILIS TOTAL IGG/IGM SCREENING: NORMAL

## 2025-01-21 ENCOUNTER — RESULTS FOLLOW-UP (OUTPATIENT)
Dept: OBGYN CLINIC | Facility: CLINIC | Age: 30
End: 2025-01-21

## 2025-01-21 LAB
ERYTHROCYTE [DISTWIDTH] IN BLOOD BY AUTOMATED COUNT: 13.3 % (ref 11.7–15.4)
GLUCOSE 1H P 50 G GLC PO SERPL-MCNC: 85 MG/DL (ref 70–139)
HCT VFR BLD AUTO: 37.3 % (ref 34–46.6)
HGB BLD-MCNC: 12.3 G/DL (ref 11.1–15.9)
MCH RBC QN AUTO: 30.3 PG (ref 26.6–33)
MCHC RBC AUTO-ENTMCNC: 33 G/DL (ref 31.5–35.7)
MCV RBC AUTO: 92 FL (ref 79–97)
PLATELET # BLD AUTO: 235 X10E3/UL (ref 150–450)
RBC # BLD AUTO: 4.06 X10E6/UL (ref 3.77–5.28)
RPR SER QL: NON REACTIVE
WBC # BLD AUTO: 10.8 X10E3/UL (ref 3.4–10.8)

## 2025-01-22 ENCOUNTER — ROUTINE PRENATAL (OUTPATIENT)
Dept: OBGYN CLINIC | Facility: CLINIC | Age: 30
End: 2025-01-22
Payer: COMMERCIAL

## 2025-01-22 VITALS
HEIGHT: 64 IN | BODY MASS INDEX: 28.17 KG/M2 | WEIGHT: 165 LBS | DIASTOLIC BLOOD PRESSURE: 76 MMHG | SYSTOLIC BLOOD PRESSURE: 124 MMHG

## 2025-01-22 DIAGNOSIS — O43.123 VELAMENTOUS INSERTION OF UMBILICAL CORD IN THIRD TRIMESTER: ICD-10-CM

## 2025-01-22 DIAGNOSIS — Z23 NEED FOR TDAP VACCINATION: ICD-10-CM

## 2025-01-22 DIAGNOSIS — Z23 ENCOUNTER FOR IMMUNIZATION: ICD-10-CM

## 2025-01-22 DIAGNOSIS — Z3A.28 28 WEEKS GESTATION OF PREGNANCY: Primary | ICD-10-CM

## 2025-01-22 LAB
SL AMB  POCT GLUCOSE, UA: NORMAL
SL AMB POCT URINE PROTEIN: NORMAL

## 2025-01-22 PROCEDURE — 90715 TDAP VACCINE 7 YRS/> IM: CPT | Performed by: OBSTETRICS & GYNECOLOGY

## 2025-01-22 PROCEDURE — 90471 IMMUNIZATION ADMIN: CPT | Performed by: OBSTETRICS & GYNECOLOGY

## 2025-01-22 PROCEDURE — 81002 URINALYSIS NONAUTO W/O SCOPE: CPT | Performed by: OBSTETRICS & GYNECOLOGY

## 2025-01-22 PROCEDURE — PNV: Performed by: OBSTETRICS & GYNECOLOGY

## 2025-01-22 NOTE — PROGRESS NOTES
"Routine Prenatal Visit  [facility] [place]  [address], [address], [place] [address]    Assessment/Plan:  Fady is a 30 y.o. year old  at 28w3d who presents for routine prenatal visit.     Assessment & Plan  28 weeks gestation of pregnancy    Orders:    POCT urine dip    Encounter for immunization         Need for Tdap vaccination    Orders:    Tdap Vaccine greater than or equal to 6yo    Velamentous insertion of umbilical cord in third trimester    -  has U/S scheduled with MFM         Next OB Visit 2 weeks.    Subjective:     CC: Prenatal care    Fady Molina is a 30 y.o.  female who presents for routine prenatal care at 28w3d.  Pregnancy ROS: no leakage of fluid, pelvic pain, or vaginal bleeding.  normal fetal movement.    The following portions of the patient's history were reviewed and updated as appropriate: allergies, current medications, past family history, past medical history, obstetric history, gynecologic history, past social history, past surgical history and problem list.      Objective:  /76 (BP Location: Left arm, Patient Position: Sitting, Cuff Size: Standard)   Ht 5' 4\" (1.626 m)   Wt 74.8 kg (165 lb)   LMP 2024 (Approximate)   BMI 28.32 kg/m²   Pregravid Weight/BMI: 65.8 kg (145 lb) (BMI 24.88)  Current Weight: 74.8 kg (165 lb)   Total Weight Gain: 9.072 kg (20 lb)   Pre-Tracie Vitals      Flowsheet Row Most Recent Value   Prenatal Assessment    Prenatal Vitals    Blood Pressure 124/76   Weight - Scale 74.8 kg (165 lb)   Urine Albumin/Glucose    Dilation/Effacement/Station    Vaginal Drainage    Edema              General: Well appearing, no distress  Abdomen: Soft, gravid, nontender  Extremities: Non tender.  "

## 2025-01-24 ENCOUNTER — ULTRASOUND (OUTPATIENT)
Dept: PERINATAL CARE | Facility: OTHER | Age: 30
End: 2025-01-24
Payer: COMMERCIAL

## 2025-01-24 VITALS
HEART RATE: 94 BPM | BODY MASS INDEX: 26.42 KG/M2 | HEIGHT: 66 IN | WEIGHT: 164.4 LBS | DIASTOLIC BLOOD PRESSURE: 80 MMHG | SYSTOLIC BLOOD PRESSURE: 118 MMHG

## 2025-01-24 DIAGNOSIS — O43.123 VELAMENTOUS INSERTION OF UMBILICAL CORD IN THIRD TRIMESTER: ICD-10-CM

## 2025-01-24 DIAGNOSIS — Z3A.28 28 WEEKS GESTATION OF PREGNANCY: Primary | ICD-10-CM

## 2025-01-24 PROCEDURE — 99213 OFFICE O/P EST LOW 20 MIN: CPT | Performed by: OBSTETRICS & GYNECOLOGY

## 2025-01-24 PROCEDURE — 76816 OB US FOLLOW-UP PER FETUS: CPT | Performed by: OBSTETRICS & GYNECOLOGY

## 2025-01-24 NOTE — PROGRESS NOTES
The patient was seen today for an ultrasound.  Please see ultrasound report (located under Ob Procedures) for additional details.   Thank you very much for allowing us to participate in the care of this very nice patient.  Should you have any questions, please do not hesitate to contact me.     Steven Perry MD FACOG  Attending Physician, Maternal-Fetal Medicine  [facility]

## 2025-02-03 ENCOUNTER — CLINICAL SUPPORT (OUTPATIENT)
Dept: POSTPARTUM | Facility: CLINIC | Age: 30
End: 2025-02-03

## 2025-02-03 DIAGNOSIS — Z32.2 ENCOUNTER FOR CHILDBIRTH INSTRUCTION: Primary | ICD-10-CM

## 2025-02-05 ENCOUNTER — ROUTINE PRENATAL (OUTPATIENT)
Dept: OBGYN CLINIC | Facility: CLINIC | Age: 30
End: 2025-02-05
Payer: COMMERCIAL

## 2025-02-05 VITALS
SYSTOLIC BLOOD PRESSURE: 120 MMHG | BODY MASS INDEX: 26.03 KG/M2 | HEIGHT: 66 IN | DIASTOLIC BLOOD PRESSURE: 78 MMHG | WEIGHT: 162 LBS

## 2025-02-05 DIAGNOSIS — Z3A.30 30 WEEKS GESTATION OF PREGNANCY: Primary | ICD-10-CM

## 2025-02-05 DIAGNOSIS — O43.123 VELAMENTOUS INSERTION OF UMBILICAL CORD IN THIRD TRIMESTER: ICD-10-CM

## 2025-02-05 LAB
SL AMB  POCT GLUCOSE, UA: NORMAL
SL AMB POCT URINE PROTEIN: NORMAL

## 2025-02-05 PROCEDURE — 81002 URINALYSIS NONAUTO W/O SCOPE: CPT | Performed by: OBSTETRICS & GYNECOLOGY

## 2025-02-05 PROCEDURE — PNV: Performed by: OBSTETRICS & GYNECOLOGY

## 2025-02-05 NOTE — PROGRESS NOTES
"Routine Prenatal Visit  [facility] [place]  [address], [address], [place] [address]    Assessment/Plan:  Fady is a 30 y.o. year old  at 30w3d who presents for routine prenatal visit.     Assessment & Plan  30 weeks gestation of pregnancy    Orders:    POCT urine dip    Velamentous insertion of umbilical cord in third trimester     -  Reviewed growth U/S results.  Has f/u U/S scheduled for in 4 weeks         Next OB Visit 2 weeks.    Subjective:     CC: Prenatal care    Fady Molina is a 30 y.o.  female who presents for routine prenatal care at 30w3d.  Pregnancy ROS: no leakage of fluid, pelvic pain, or vaginal bleeding.  normal fetal movement.    The following portions of the patient's history were reviewed and updated as appropriate: allergies, current medications, past family history, past medical history, obstetric history, gynecologic history, past social history, past surgical history and problem list.      Objective:  /78 (BP Location: Left arm, Patient Position: Sitting, Cuff Size: Standard)   Ht 5' 6\" (1.676 m)   Wt 73.5 kg (162 lb)   LMP 2024 (Approximate)   BMI 26.15 kg/m²   Pregravid Weight/BMI: 65.8 kg (145 lb) (BMI 23.41)  Current Weight: 73.5 kg (162 lb)   Total Weight Gain: 7.711 kg (17 lb)   Pre- Vitals      Flowsheet Row Most Recent Value   Prenatal Assessment    Fetal Heart Rate 150   Fundal Height (cm) 30 cm   Movement Present   Prenatal Vitals    Blood Pressure 120/78   Weight - Scale 73.5 kg (162 lb)   Urine Albumin/Glucose    Dilation/Effacement/Station    Vaginal Drainage    Draining Fluid No   Edema              General: Well appearing, no distress  Abdomen: Soft, gravid, nontender  Extremities: Non tender.  "

## 2025-02-09 ENCOUNTER — NURSE TRIAGE (OUTPATIENT)
Dept: OTHER | Facility: OTHER | Age: 30
End: 2025-02-09

## 2025-02-09 NOTE — TELEPHONE ENCOUNTER
"Reason for Disposition  • Slight spotting after sexual intercourse (brief episode)    Answer Assessment - Initial Assessment Questions  1. ONSET: \"When did this bleeding start?\"           2/9 10 minutes ago      2. BLEEDING SEVERITY: \"Describe the bleeding that you are having.\" \"How much bleeding is there?\"         \"A lot of blood in the toilet bowl and when wiping\" Bleeding has stopped now      3. ABDOMEN PAIN: \"Do you have any pain?\" \"How bad is the pain?\"  (e.g., Scale 0-10; none, mild, moderate, or severe)        No    4. PREGNANCY: \"Do you know how many weeks or months pregnant you are?\"         31wks    5. JADA: \"What date are you expecting to deliver?\"        4/13/25    6. FETAL MOVEMENT: \"Has the baby's movement decreased or changed significantly from normal?\"      Good FM     7. HIGH-RISK PREGNANCY:  \"Have been told by your doctor that you have a high-risk condition that can cause bleeding?\"  (e.g., placenta previa, vasa previa)         No    8. ULTRASOUND: \"Have you had an ultrasound during this pregnancy?\"  Note: To confirm intrauterine pregnancy, placenta location.        Yes    9. HEMODYNAMIC STATUS: \"Are you weak or feeling lightheaded?\" If Yes, ask: \"Can you stand and walk normally?\"         Denies    10. OTHER SYMPTOMS: \"What other symptoms are you having with the bleeding?\" (e.g., leaking fluid from vagina, contractions)        Denies    Protocols used: Pregnancy - Vaginal Bleeding Greater Than 20 Weeks [id number]-AH    "

## 2025-02-09 NOTE — TELEPHONE ENCOUNTER
ESC message sent to on call OB regarding patients vaginal bleeding after intercourse. Provider stated she can continue to monitor from home as long as there is no ongoing bleeding, cramping, or pelvic pain. She should present to triage for evaluation in the event of any additional bleeding, leaking of fluid, pelvic pain/contractions, or decreased fetal movement. Informed patient and she verbalized understanding.

## 2025-02-10 ENCOUNTER — CLINICAL SUPPORT (OUTPATIENT)
Dept: POSTPARTUM | Facility: CLINIC | Age: 30
End: 2025-02-10

## 2025-02-10 DIAGNOSIS — Z32.2 ENCOUNTER FOR CHILDBIRTH INSTRUCTION: Primary | ICD-10-CM

## 2025-02-12 ENCOUNTER — TELEPHONE (OUTPATIENT)
Dept: OBGYN CLINIC | Facility: CLINIC | Age: 30
End: 2025-02-12

## 2025-02-12 NOTE — TELEPHONE ENCOUNTER
Third Trimester Call (30-34 weeks)     Overall how are you feeling? Pt stated everything is fine and baby is moving. No concerns at this time.     Compliant with routine OB appointments? Yes    Have you completed your 3rd trimester lab work? Yes    Have you reviewed the contents of 3rd trimester folder from office?    Have you decided on a pediatrician? Yes    [facility]     If no, reviewed practices and transferred call to [phone] to set up appointment with pediatric office.     Questions on paperwork to go back to office? No   Questions on the baby birth certificate and photography forms? No      Send link for the Hospital Readiness Video via [email or web address]

## 2025-02-17 ENCOUNTER — CLINICAL SUPPORT (OUTPATIENT)
Dept: POSTPARTUM | Facility: CLINIC | Age: 30
End: 2025-02-17

## 2025-02-17 DIAGNOSIS — Z32.2 ENCOUNTER FOR CHILDBIRTH INSTRUCTION: Primary | ICD-10-CM

## 2025-02-18 ENCOUNTER — ROUTINE PRENATAL (OUTPATIENT)
Dept: OBGYN CLINIC | Facility: CLINIC | Age: 30
End: 2025-02-18
Payer: COMMERCIAL

## 2025-02-18 VITALS
BODY MASS INDEX: 26.03 KG/M2 | SYSTOLIC BLOOD PRESSURE: 114 MMHG | DIASTOLIC BLOOD PRESSURE: 70 MMHG | HEIGHT: 66 IN | WEIGHT: 162 LBS

## 2025-02-18 DIAGNOSIS — Z3A.32 32 WEEKS GESTATION OF PREGNANCY: ICD-10-CM

## 2025-02-18 DIAGNOSIS — O43.123 VELAMENTOUS INSERTION OF UMBILICAL CORD IN THIRD TRIMESTER: Primary | ICD-10-CM

## 2025-02-18 LAB
SL AMB  POCT GLUCOSE, UA: NORMAL
SL AMB POCT URINE PROTEIN: NORMAL

## 2025-02-18 PROCEDURE — PNV: Performed by: PHYSICIAN ASSISTANT

## 2025-02-18 PROCEDURE — 81002 URINALYSIS NONAUTO W/O SCOPE: CPT | Performed by: PHYSICIAN ASSISTANT

## 2025-02-18 NOTE — ASSESSMENT & PLAN NOTE
Continue routine prenatal care.   Return to office for ob check in 2 weeks.      tele rn note 



grand daughter also informed me that the wife of patient is in ER with same diagnosis and if 
they can room together so that way patient is more cooperative. call chiki Sargent. told her 
there is a room available on the unit.

## 2025-02-18 NOTE — PROGRESS NOTES
"Routine Prenatal Visit  [facility] - [address], [address], [place] [address]    Assessment/Plan:  Fady is a 30 y.o. year old  at 32w2d who presents for routine prenatal visit.     1. Velamentous insertion of umbilical cord in third trimester  Assessment & Plan:  Has follow up growth ultrasound scheduled 2025.   2. 32 weeks gestation of pregnancy  Assessment & Plan:  Continue routine prenatal care.   Return to office for ob check in 2 weeks.     Orders:  -     POCT urine dip      Next OB Visit 2 weeks.    Subjective:     CC: Prenatal care    Fady Molina is a 30 y.o.  female who presents for routine prenatal care at 32w2d.  Pregnancy ROS: Good FM, Patient called off 2025 for an episode of bleeding after intercourse, reports was a lot of bright red bleeding immediately after intercourse that resolved quickly in less than 10 minutes, no cramping and baby moving well. Has not had any bleeding since. No N/V, HA, cramping, LOF, edema, domestic violence, or smoking. Tolerating PNV.      The following portions of the patient's history were reviewed and updated as appropriate: allergies, current medications, past family history, past medical history, obstetric history, gynecologic history, past social history, past surgical history and problem list.      Objective:  /70 (BP Location: Left arm, Patient Position: Sitting, Cuff Size: Standard)   Ht 5' 6\" (1.676 m)   Wt 73.5 kg (162 lb)   LMP 2024 (Approximate)   BMI 26.15 kg/m²   Pregravid Weight/BMI: 65.8 kg (145 lb) (BMI 23.41)  Current Weight: 73.5 kg (162 lb)   Total Weight Gain: 7.711 kg (17 lb)   Pre- Vitals      Flowsheet Row Most Recent Value   Prenatal Assessment    Fetal Heart Rate 150   Fundal Height (cm) 32 cm   Movement Present   Prenatal Vitals    Blood Pressure 114/70   Weight - Scale 73.5 kg (162 lb)   Urine Albumin/Glucose    Dilation/Effacement/Station    Vaginal Drainage  "   Edema    LLE Edema None   RLE Edema None   Facial Edema None             General: Well appearing, no distress  Abdomen: Soft, gravid, nontender  Fundal Height: Appropriate for gestational age.  Extremities: Warm and well perfused.  Non tender.

## 2025-02-25 ENCOUNTER — ULTRASOUND (OUTPATIENT)
Dept: PERINATAL CARE | Facility: OTHER | Age: 30
End: 2025-02-25
Payer: COMMERCIAL

## 2025-02-25 VITALS
BODY MASS INDEX: 26.61 KG/M2 | HEART RATE: 111 BPM | DIASTOLIC BLOOD PRESSURE: 64 MMHG | SYSTOLIC BLOOD PRESSURE: 126 MMHG | WEIGHT: 165.6 LBS | HEIGHT: 66 IN

## 2025-02-25 DIAGNOSIS — O43.123 VELAMENTOUS INSERTION OF UMBILICAL CORD IN THIRD TRIMESTER: Primary | ICD-10-CM

## 2025-02-25 DIAGNOSIS — Z3A.33 33 WEEKS GESTATION OF PREGNANCY: ICD-10-CM

## 2025-02-25 PROCEDURE — 99213 OFFICE O/P EST LOW 20 MIN: CPT | Performed by: OBSTETRICS & GYNECOLOGY

## 2025-02-25 PROCEDURE — 76816 OB US FOLLOW-UP PER FETUS: CPT | Performed by: OBSTETRICS & GYNECOLOGY

## 2025-02-25 PROCEDURE — 76819 FETAL BIOPHYS PROFIL W/O NST: CPT | Performed by: OBSTETRICS & GYNECOLOGY

## 2025-02-25 NOTE — PROGRESS NOTES
The patient was seen today for an ultrasound.  Please see ultrasound report (located under Ob Procedures) for additional details.   Thank you very much for allowing us to participate in the care of this very nice patient.  Should you have any questions, please do not hesitate to contact me.     Steven Perry MD FACOG  Attending Physician, Maternal-Fetal Medicine  [facility]

## 2025-03-05 ENCOUNTER — ROUTINE PRENATAL (OUTPATIENT)
Dept: OBGYN CLINIC | Facility: CLINIC | Age: 30
End: 2025-03-05
Payer: COMMERCIAL

## 2025-03-05 VITALS
BODY MASS INDEX: 26.84 KG/M2 | SYSTOLIC BLOOD PRESSURE: 112 MMHG | WEIGHT: 167 LBS | HEIGHT: 66 IN | DIASTOLIC BLOOD PRESSURE: 82 MMHG

## 2025-03-05 DIAGNOSIS — O43.123 VELAMENTOUS INSERTION OF UMBILICAL CORD IN THIRD TRIMESTER: Primary | ICD-10-CM

## 2025-03-05 DIAGNOSIS — Z3A.34 34 WEEKS GESTATION OF PREGNANCY: ICD-10-CM

## 2025-03-05 LAB
SL AMB  POCT GLUCOSE, UA: NORMAL
SL AMB POCT URINE PROTEIN: NORMAL

## 2025-03-05 PROCEDURE — 81002 URINALYSIS NONAUTO W/O SCOPE: CPT | Performed by: STUDENT IN AN ORGANIZED HEALTH CARE EDUCATION/TRAINING PROGRAM

## 2025-03-05 PROCEDURE — PNV: Performed by: STUDENT IN AN ORGANIZED HEALTH CARE EDUCATION/TRAINING PROGRAM

## 2025-03-05 NOTE — ASSESSMENT & PLAN NOTE
- PTL/PPROM/Bleeding precautions given. Kick counts reviewed.  - Reviewed plan for collection of GBS swab at 36 weeks.  - Problem list updated, results console reviewed and updated with pertinent prenatal labs.  - PMH, PSH, medications reviewed and updated as needed  - Return to office in 2 wk(s) for routine prenatal care    Orders:  •  POCT urine dip

## 2025-03-05 NOTE — PROGRESS NOTES
"Routine Prenatal Visit  [facility] - [place] [address], [address], [place] [address]  Assessment & Plan  Velamentous insertion of umbilical cord in third trimester  - Normal fetal growth US at 33 weeks. Repeat scheduled at 37 weeks.   - Plan for initiation of APFS beginning at 36 weeks. Scheduled with MFM.        34 weeks gestation of pregnancy  - PTL/PPROM/Bleeding precautions given. Kick counts reviewed.  - Reviewed plan for collection of GBS swab at 36 weeks.  - Problem list updated, results console reviewed and updated with pertinent prenatal labs.  - PMH, PSH, medications reviewed and updated as needed  - Return to office in 2 wk(s) for routine prenatal care    Orders:  •  POCT urine dip    Subjective:   Fady Molina is a 30 y.o.  who presents for routine prenatal care at 34w3d.  Complaints today: None  LOF: No; VB: No; Contractions: No; FM: Present and normal    Objective:  /82 (BP Location: Right arm, Patient Position: Sitting, Cuff Size: Standard)   Ht 5' 6\" (1.676 m)   Wt 75.8 kg (167 lb)   LMP 2024 (Approximate)   BMI 26.95 kg/m²     General: Well appearing, no distress  Respiratory: Unlabored breathing  Cardiovascular: Regular rate.  Abdomen: Soft, gravid, nontender  Extremities: Warm and well perfused.  Non tender.    Pregravid Weight/BMI: 65.8 kg (145 lb) (BMI 23.41)  Current Weight: 75.8 kg (167 lb)   Total Weight Gain: 9.979 kg (22 lb)     Pre-Tracie Vitals    Flowsheet Row Most Recent Value   Prenatal Assessment    Fetal Heart Rate 135   Fundal Height (cm) 34 cm   Movement Present   Prenatal Vitals    Blood Pressure 112/82   Weight - Scale 75.8 kg (167 lb)   Urine Albumin/Glucose    Dilation/Effacement/Station    Vaginal Drainage    Draining Fluid No   Edema    LLE Edema None   RLE Edema None   Facial Edema None           Simon Whalen MD  3/5/2025 6:15 PM   " no

## 2025-03-05 NOTE — ASSESSMENT & PLAN NOTE
- Normal fetal growth US at 33 weeks. Repeat scheduled at 37 weeks.   - Plan for initiation of APFS beginning at 36 weeks. Scheduled with M.

## 2025-03-17 NOTE — PATIENT INSTRUCTIONS
Thank you for choosing us for your  care today.  If you have any questions about your ultrasound or care, please do not hesitate to contact us or your primary obstetrician.        Some general instructions for your pregnancy are:    Exercise: Aim for 150 minutes per week of regular exercise.  Walking is great!  Nutrition: Choose healthy sources of calcium, iron, and protein.  Avoid ultraprocessed foods and added sugar.  Learn about Preeclampsia: preeclampsia is a common, potentially serious high blood pressure complication in pregnancy.  A blood pressure of 140mmHg (systolic or top number) or 90mmHg (diastolic or bottom number) should be evaluated by your doctor.  Aspirin is sometimes prescribed in early pregnancy to prevent preeclampsia in women with risk factors - ask your obstetrician if you should be on this medication.  For more resources, visit:  https://www.highriskpregnancyinfo.org/preeclampsia  If you smoke, please try to quit completely but also try to reduce your smoking by as much as possible (as soon as possible).  Do not vape.  Please also avoid cannabis products.  Other warning signs to watch out for in pregnancy or postpartum: chest pain, obstructed breathing or shortness of breath, seizures, thoughts of hurting yourself or your baby, bleeding, a painful or swollen leg, fever, or headache (see [facility]).  If these happen call 911.  Itching is also not normal in pregnancy and if you experience this, especially over your hands and feet, potentially worse at night, notify your doctors.     Kick Counts in Pregnancy   AMBULATORY CARE:   Kick counts  measure how much your baby is moving in your womb. A kick from your baby can be felt as a twist, turn, swish, roll, or jab. It is common to feel your baby kicking at 26 to 28 weeks of pregnancy. You may feel your baby kick as early as 20 weeks of pregnancy. You may want to start counting at 28 weeks.   Contact your  doctor immediately if:   You feel a change in the number of kicks or movements of your baby.      You feel fewer than 10 kicks within 2 hours.      You have questions or concerns about your baby's movements.     Why measure kick counts:  Your baby's movement may provide information about your baby's health. He or she may move less, or not at all, if there are problems. Your baby may move less if he or she is not getting enough oxygen or nutrition from the placenta. Do not smoke while you are pregnant. Smoking decreases the amount of oxygen that gets to your baby. Talk to your healthcare provider if you need help to quit smoking. Tell your healthcare provider as soon as you feel a change in your baby's movements.  When to measure kick counts:   Measure kick counts at the same time every day.       Measure kick counts when your baby is awake and most active. Your baby may be most active in the evening.     How to measure kick counts:  Check that your baby is awake before you measure kick counts. You can wake up your baby by lightly pushing on your belly, walking, or drinking something cold. Your healthcare provider may tell you different ways to measure kick counts. You may be told to do the following:  Use a chart or clock to keep track of the time you start and finish counting.      Sit in a chair or lie on your left side.      Place your hands on the largest part of your belly.      Count until you reach 10 kicks. Write down how much time it takes to count 10 kicks.      It may take 30 minutes to 2 hours to count 10 kicks. It should not take more than 2 hours to count 10 kicks.     Follow up with your doctor as directed:  Write down your questions so you remember to ask them during your visits.   © Copyright [employer] 2023 Information is for End User's use only and may not be sold, redistributed or otherwise used for commercial purposes.  The above information is an  only. It is not intended as  medical advice for individual conditions or treatments. Talk to your doctor, nurse or pharmacist before following any medical regimen to see if it is safe and effective for you.

## 2025-03-19 ENCOUNTER — ROUTINE PRENATAL (OUTPATIENT)
Dept: OBGYN CLINIC | Facility: CLINIC | Age: 30
End: 2025-03-19
Payer: COMMERCIAL

## 2025-03-19 ENCOUNTER — ROUTINE PRENATAL (OUTPATIENT)
Dept: PERINATAL CARE | Facility: OTHER | Age: 30
End: 2025-03-19
Payer: COMMERCIAL

## 2025-03-19 VITALS
DIASTOLIC BLOOD PRESSURE: 84 MMHG | SYSTOLIC BLOOD PRESSURE: 132 MMHG | HEART RATE: 84 BPM | WEIGHT: 168.2 LBS | BODY MASS INDEX: 27.03 KG/M2 | HEIGHT: 66 IN

## 2025-03-19 VITALS
HEIGHT: 66 IN | SYSTOLIC BLOOD PRESSURE: 126 MMHG | BODY MASS INDEX: 26.84 KG/M2 | WEIGHT: 167 LBS | DIASTOLIC BLOOD PRESSURE: 86 MMHG

## 2025-03-19 DIAGNOSIS — Z3A.36 36 WEEKS GESTATION OF PREGNANCY: Primary | ICD-10-CM

## 2025-03-19 DIAGNOSIS — Z3A.36 36 WEEKS GESTATION OF PREGNANCY: ICD-10-CM

## 2025-03-19 DIAGNOSIS — O43.123 VELAMENTOUS INSERTION OF UMBILICAL CORD IN THIRD TRIMESTER: Primary | ICD-10-CM

## 2025-03-19 DIAGNOSIS — O43.123 VELAMENTOUS INSERTION OF UMBILICAL CORD IN THIRD TRIMESTER: ICD-10-CM

## 2025-03-19 DIAGNOSIS — Z36.85 ANTENATAL SCREENING FOR STREPTOCOCCUS B: ICD-10-CM

## 2025-03-19 LAB
SL AMB  POCT GLUCOSE, UA: NORMAL
SL AMB POCT URINE PROTEIN: NORMAL

## 2025-03-19 PROCEDURE — PNV: Performed by: STUDENT IN AN ORGANIZED HEALTH CARE EDUCATION/TRAINING PROGRAM

## 2025-03-19 PROCEDURE — 76815 OB US LIMITED FETUS(S): CPT | Performed by: OBSTETRICS & GYNECOLOGY

## 2025-03-19 PROCEDURE — 81002 URINALYSIS NONAUTO W/O SCOPE: CPT | Performed by: STUDENT IN AN ORGANIZED HEALTH CARE EDUCATION/TRAINING PROGRAM

## 2025-03-19 NOTE — ASSESSMENT & PLAN NOTE
- Labor/Bleeding/ROM precautions given. Kick counts reviewed  - GBS swab collected today.  - Delivery consent reviewed and signed today. Risks of delivery reviewed, including bleeding, infection, damage to surrounding organs/structures (specifically bowel, bladder, vessels, nerves, ureters), need for operative vaginal delivery or  delivery, hysterectomy, need for blood transfusion, need for further surgery.   - Problem list updated, results console reviewed and updated with pertinent prenatal labs.  - PMH, PSH, medications reviewed and updated as needed  - Return to office in wk(s) for routine prenatal care    Orders:  •  POCT urine dip

## 2025-03-19 NOTE — PROGRESS NOTES
The patient was seen today for an ultrasound.  Please see ultrasound report (located under Ob Procedures) for additional details.   Thank you very much for allowing us to participate in the care of this very nice patient.  Should you have any questions, please do not hesitate to contact me.     Steven Perry MD FACOG  Attending Physician, Maternal-Fetal Medicine  [facility]

## 2025-03-19 NOTE — PROGRESS NOTES
"Routine Prenatal Visit  [facility] - [place] [address], [address], [place] [address]  Assessment & Plan  Velamentous insertion of umbilical cord in third trimester  - Repeat growth US scheduled next week with MFM. Continue weekly APFS until delivery.        36 weeks gestation of pregnancy  - Labor/Bleeding/ROM precautions given. Kick counts reviewed  - GBS swab collected today.  - Delivery consent reviewed and signed today. Risks of delivery reviewed, including bleeding, infection, damage to surrounding organs/structures (specifically bowel, bladder, vessels, nerves, ureters), need for operative vaginal delivery or  delivery, hysterectomy, need for blood transfusion, need for further surgery.   - Problem list updated, results console reviewed and updated with pertinent prenatal labs.  - PMH, PSH, medications reviewed and updated as needed  - Return to office in wk(s) for routine prenatal care    Orders:  •  POCT urine dip     screening for streptococcus B    Orders:  •  Strep B DNA probe, amplification    Subjective:   Fady Molina is a 30 y.o.  who presents for routine prenatal care at 36w3d.  Complaints today: None  LOF: No; VB: No; Contractions: No; FM: Present and normal    Objective:  /86 (BP Location: Right arm, Patient Position: Sitting, Cuff Size: Standard)   Ht 5' 6\" (1.676 m)   Wt 75.8 kg (167 lb)   LMP 2024 (Approximate)   BMI 26.95 kg/m²     General: Well appearing, no distress  Respiratory: Unlabored breathing  Cardiovascular: Regular rate.  Abdomen: Soft, gravid, nontender  Extremities: Warm and well perfused.  Non tender.    Pregravid Weight/BMI: 65.8 kg (145 lb) (BMI 23.41)  Current Weight: 75.8 kg (167 lb)   Total Weight Gain: 9.979 kg (22 lb)     Pre- Vitals    Flowsheet Row Most Recent Value   Prenatal Assessment    Fetal Heart Rate 140   Fundal Height (cm) 36 cm   Movement Present   Presentation Vertex   Prenatal Vitals  "   Blood Pressure 126/86   Weight - Scale 75.8 kg (167 lb)   Urine Albumin/Glucose    Dilation/Effacement/Station    Vaginal Drainage    Draining Fluid No   Edema    LLE Edema None   RLE Edema None   Facial Edema None           Simon Whalen MD  3/19/2025 6:02 PM

## 2025-03-20 ENCOUNTER — TELEPHONE (OUTPATIENT)
Age: 30
End: 2025-03-20

## 2025-03-20 NOTE — LETTER
March 20, 2025     Patient: Fady Molina  YOB: 1995  Date of Visit: 3/20/2025      To Whom it May Concern:    Fady Molina is under my professional care. Fady is currently pregnant with estimated due date of 4/13/25. It is recommended that patient be allowed to work from home between now and time of delivery due to the long distance between her work location and the delivering hospital.    If you have any questions or concerns, please don't hesitate to call.         Sincerely,        Simon Whalen MD

## 2025-03-20 NOTE — TELEPHONE ENCOUNTER
OB 36w4d  requesting letter to be uploaded to [employer] stating that due to distance patient cannot come in to office at work as it is over an hour away from delivering hospital. Patient states she can continue to work from home but would like letter stating this if possible.     Routing to provider patient last saw in office for review.

## 2025-03-20 NOTE — PATIENT INSTRUCTIONS
Thank you for choosing us for your  care today.  If you have any questions about your ultrasound or care, please do not hesitate to contact us or your primary obstetrician.        Some general instructions for your pregnancy are:    Exercise: Aim for 150 minutes per week of regular exercise.  Walking is great!  Nutrition: Choose healthy sources of calcium, iron, and protein.  Avoid ultraprocessed foods and added sugar.  Learn about Preeclampsia: preeclampsia is a common, potentially serious high blood pressure complication in pregnancy.  A blood pressure of 140mmHg (systolic or top number) or 90mmHg (diastolic or bottom number) should be evaluated by your doctor.  Aspirin is sometimes prescribed in early pregnancy to prevent preeclampsia in women with risk factors - ask your obstetrician if you should be on this medication.  For more resources, visit:  https://www.highriskpregnancyinfo.org/preeclampsia  If you smoke, please try to quit completely but also try to reduce your smoking by as much as possible (as soon as possible).  Do not vape.  Please also avoid cannabis products.  Other warning signs to watch out for in pregnancy or postpartum: chest pain, obstructed breathing or shortness of breath, seizures, thoughts of hurting yourself or your baby, bleeding, a painful or swollen leg, fever, or headache (see [facility] POST-BIRTH Warning Signs campaign).  If these happen call 911.  Itching is also not normal in pregnancy and if you experience this, especially over your hands and feet, potentially worse at night, notify your doctors.     Nonstress Test for Pregnancy   WHAT YOU NEED TO KNOW:   What do I need to know about a nonstress test?  A nonstress test measures your baby's heart rate and movements. Nonstress means that no stress will be placed on your baby during the test.  How do I prepare for a nonstress test?  Your healthcare provider will talk to you about how to prepare for this test. Your provider may  tell you to eat and drink plenty of liquids before your test. If you smoke, you may be asked not to smoke within 2 hours before the test. Your provider will also tell you which medicines to take or not take on the day of your test.  What will happen during a nonstress test?  You may be asked to lie down or recline back for the test on a bed. One or 2 belts with sensors will be placed around your abdomen. Your baby's heart rate will be recorded with a machine. If your baby does not move, your baby may be asleep. Your healthcare provider may make a noise near your abdomen to try to wake your baby. The test usually takes about 20 minutes, but can take longer if your baby needs to be awakened.        What do I need to know about the test results?  Your baby will be expected to move at least 2 times for a certain amount of time. Your baby's heart rate will be expected to go up by a certain number of beats per minute during movement. If your baby does not move as expected, the test may need to be repeated or you may need other tests.  CARE AGREEMENT:   You have the right to help plan your care. Learn about your health condition and how it may be treated. Discuss treatment options with your healthcare providers to decide what care you want to receive. You always have the right to refuse treatment. The above information is an  only. It is not intended as medical advice for individual conditions or treatments. Talk to your doctor, nurse or pharmacist before following any medical regimen to see if it is safe and effective for you.  © 2023 Information is for End User's use only and may not be sold, redistributed or otherwise used for commercial purposes. Thank you for choosing us for your  care today.  If you have any questions about your ultrasound or care, please do not hesitate to contact us or your primary obstetrician.        Some general instructions for your pregnancy  are:    Exercise: Aim for 150 minutes per week of regular exercise.  Walking is great!  Nutrition: Choose healthy sources of calcium, iron, and protein.  Avoid ultraprocessed foods and added sugar.  Learn about Preeclampsia: preeclampsia is a common, potentially serious high blood pressure complication in pregnancy.  A blood pressure of 140mmHg (systolic or top number) or 90mmHg (diastolic or bottom number) should be evaluated by your doctor.  Aspirin is sometimes prescribed in early pregnancy to prevent preeclampsia in women with risk factors - ask your obstetrician if you should be on this medication.  For more resources, visit:  https://www.highriskpregnancyinfo.org/preeclampsia  If you smoke, please try to quit completely but also try to reduce your smoking by as much as possible (as soon as possible).  Do not vape.  Please also avoid cannabis products.  Other warning signs to watch out for in pregnancy or postpartum: chest pain, obstructed breathing or shortness of breath, seizures, thoughts of hurting yourself or your baby, bleeding, a painful or swollen leg, fever, or headache (see [facility] POST-BIRTH Warning Signs campaign).  If these happen call 911.  Itching is also not normal in pregnancy and if you experience this, especially over your hands and feet, potentially worse at night, notify your doctors.     Nonstress Test for Pregnancy   WHAT YOU NEED TO KNOW:   What do I need to know about a nonstress test?  A nonstress test measures your baby's heart rate and movements. Nonstress means that no stress will be placed on your baby during the test.  How do I prepare for a nonstress test?  Your healthcare provider will talk to you about how to prepare for this test. Your provider may tell you to eat and drink plenty of liquids before your test. If you smoke, you may be asked not to smoke within 2 hours before the test. Your provider will also tell you which medicines to take or not take on the day of your  test.  What will happen during a nonstress test?  You may be asked to lie down or recline back for the test on a bed. One or 2 belts with sensors will be placed around your abdomen. Your baby's heart rate will be recorded with a machine. If your baby does not move, your baby may be asleep. Your healthcare provider may make a noise near your abdomen to try to wake your baby. The test usually takes about 20 minutes, but can take longer if your baby needs to be awakened.        What do I need to know about the test results?  Your baby will be expected to move at least 2 times for a certain amount of time. Your baby's heart rate will be expected to go up by a certain number of beats per minute during movement. If your baby does not move as expected, the test may need to be repeated or you may need other tests.  CARE AGREEMENT:   You have the right to help plan your care. Learn about your health condition and how it may be treated. Discuss treatment options with your healthcare providers to decide what care you want to receive. You always have the right to refuse treatment. The above information is an  only. It is not intended as medical advice for individual conditions or treatments. Talk to your doctor, nurse or pharmacist before following any medical regimen to see if it is safe and effective for you.  © Copyright [employer] 2023 Information is for End User's use only and may not be sold, redistributed or otherwise used for commercial purposes. Kick Counts in Pregnancy   AMBULATORY CARE:   Kick counts  measure how much your baby is moving in your womb. A kick from your baby can be felt as a twist, turn, swish, roll, or jab. It is common to feel your baby kicking at 26 to 28 weeks of pregnancy. You may feel your baby kick as early as 20 weeks of pregnancy. You may want to start counting at 28 weeks.   Contact your doctor immediately if:   You feel a change in the number of kicks or movements of your baby.       You feel fewer than 10 kicks within 2 hours.      You have questions or concerns about your baby's movements.     Why measure kick counts:  Your baby's movement may provide information about your baby's health. He or she may move less, or not at all, if there are problems. Your baby may move less if he or she is not getting enough oxygen or nutrition from the placenta. Do not smoke while you are pregnant. Smoking decreases the amount of oxygen that gets to your baby. Talk to your healthcare provider if you need help to quit smoking. Tell your healthcare provider as soon as you feel a change in your baby's movements.  When to measure kick counts:   Measure kick counts at the same time every day.       Measure kick counts when your baby is awake and most active. Your baby may be most active in the evening.     How to measure kick counts:  Check that your baby is awake before you measure kick counts. You can wake up your baby by lightly pushing on your belly, walking, or drinking something cold. Your healthcare provider may tell you different ways to measure kick counts. You may be told to do the following:  Use a chart or clock to keep track of the time you start and finish counting.      Sit in a chair or lie on your left side.      Place your hands on the largest part of your belly.      Count until you reach 10 kicks. Write down how much time it takes to count 10 kicks.      It may take 30 minutes to 2 hours to count 10 kicks. It should not take more than 2 hours to count 10 kicks.     Follow up with your doctor as directed:  Write down your questions so you remember to ask them during your visits.   © Copyright [employer] 2023 Information is for End User's use only and may not be sold, redistributed or otherwise used for commercial purposes.  The above information is an  only. It is not intended as medical advice for individual conditions or treatments. Talk to your doctor, nurse or pharmacist  before following any medical regimen to see if it is safe and effective for you.

## 2025-03-21 ENCOUNTER — TELEPHONE (OUTPATIENT)
Age: 30
End: 2025-03-21

## 2025-03-21 ENCOUNTER — ROUTINE PRENATAL (OUTPATIENT)
Dept: PERINATAL CARE | Facility: OTHER | Age: 30
End: 2025-03-21
Payer: COMMERCIAL

## 2025-03-21 VITALS
DIASTOLIC BLOOD PRESSURE: 76 MMHG | SYSTOLIC BLOOD PRESSURE: 122 MMHG | BODY MASS INDEX: 26.84 KG/M2 | WEIGHT: 167 LBS | HEART RATE: 90 BPM | HEIGHT: 66 IN

## 2025-03-21 DIAGNOSIS — Z3A.36 36 WEEKS GESTATION OF PREGNANCY: ICD-10-CM

## 2025-03-21 DIAGNOSIS — O43.123 VELAMENTOUS INSERTION OF UMBILICAL CORD IN THIRD TRIMESTER: Primary | ICD-10-CM

## 2025-03-21 LAB — GP B STREP DNA SPEC QL NAA+PROBE: POSITIVE

## 2025-03-21 PROCEDURE — 59025 FETAL NON-STRESS TEST: CPT | Performed by: OBSTETRICS & GYNECOLOGY

## 2025-03-21 NOTE — PROGRESS NOTES
Repeat Non-Stress Testing:    Patient verbalizes +FM. Pt denies ALL:               Leaking of fluid   Contractions   Vaginal bleeding - patient states she had spotting after her cervical check in the OB office Wednesday. Her OB was made aware .   Decreased fetal movement    Patient is performing daily kick counts. Patient has no questions or concerns.   NST strip reviewed by Dr. Connell in-person.

## 2025-03-24 ENCOUNTER — CLINICAL SUPPORT (OUTPATIENT)
Age: 30
End: 2025-03-24

## 2025-03-24 ENCOUNTER — RESULTS FOLLOW-UP (OUTPATIENT)
Dept: OBGYN CLINIC | Facility: CLINIC | Age: 30
End: 2025-03-24

## 2025-03-24 DIAGNOSIS — Z32.2 ENCOUNTER FOR CHILDBIRTH INSTRUCTION: Primary | ICD-10-CM

## 2025-03-24 PROBLEM — Z3A.37 37 WEEKS GESTATION OF PREGNANCY: Status: ACTIVE | Noted: 2024-10-29

## 2025-03-24 NOTE — TELEPHONE ENCOUNTER
Result message sent to patient explaining result and plan.    Simon Whalen MD  3/24/2025 8:02 AM

## 2025-03-26 ENCOUNTER — ROUTINE PRENATAL (OUTPATIENT)
Dept: OBGYN CLINIC | Facility: CLINIC | Age: 30
End: 2025-03-26
Payer: COMMERCIAL

## 2025-03-26 VITALS
WEIGHT: 167.6 LBS | DIASTOLIC BLOOD PRESSURE: 74 MMHG | HEIGHT: 66 IN | SYSTOLIC BLOOD PRESSURE: 122 MMHG | BODY MASS INDEX: 26.93 KG/M2

## 2025-03-26 DIAGNOSIS — O43.123 VELAMENTOUS INSERTION OF UMBILICAL CORD IN THIRD TRIMESTER: Primary | ICD-10-CM

## 2025-03-26 DIAGNOSIS — O99.820 GBS (GROUP B STREPTOCOCCUS CARRIER), +RV CULTURE, CURRENTLY PREGNANT: ICD-10-CM

## 2025-03-26 DIAGNOSIS — Z3A.37 37 WEEKS GESTATION OF PREGNANCY: ICD-10-CM

## 2025-03-26 LAB
SL AMB  POCT GLUCOSE, UA: NORMAL
SL AMB POCT URINE PROTEIN: NORMAL

## 2025-03-26 PROCEDURE — 81002 URINALYSIS NONAUTO W/O SCOPE: CPT | Performed by: OBSTETRICS & GYNECOLOGY

## 2025-03-26 PROCEDURE — PNV: Performed by: OBSTETRICS & GYNECOLOGY

## 2025-03-26 NOTE — PROGRESS NOTES
Routine Prenatal Visit  [facility] - [place] [address], [address], [place] [address]    Assessment/Plan:  Fady is a 30 y.o. year old  at 37w3d who presents for routine prenatal visit.     Assessment & Plan  Velamentous insertion of umbilical cord in third trimester  Has been getting serial growth u/s and APFS testing weekly for velamentous cord.  Last growth 33 wks AGA.  Today fundal height for me is less than expected, but this is my first exam of patient.  She had VICKY and NST 3/19/2025 which was normal.  Normal fetal movement.  Patient reports has growth u/s with MFM this Friday.     Interested in IOL at 39 weeks due to concern for Velamentous cord.  Reviewed with patient that any pregnant women can undergo an elective induction of labor at 39 weeks or later, depending on hospital availability.  One study showed that induction of labor of first time mothers at 39 weeks compared to waiting until 41 weeks, decreased  rate by 3%, and decreased risk of developing high blood pressure in pregnancy.  Alternatively induction of labor can often result in longer in-hospital stays overall, than allowing for spontaneous labor.  This is especially true in patients who have an unfavorable cervix and require the additional step of cervical ripening prior to induction of labor - leading often to an additional 12-24 hours in the hospital prior to delivery, during which there is continuous fetal monitoring. Discussed these pros and cons in relation to patient's desires for her delivery process, as well as the fact that she is a healthy patient with no other risk factors.  Discussed next visit we will check her cervix and make a plan for induction after 39 weeks if that is what she wishes.         GBS (group B Streptococcus carrier), +RV culture, currently pregnant  Reviewed GBS positive.  Treat in labor.         37 weeks gestation of pregnancy    Orders:    POCT urine dip      Next OB Visit 1  "weeks.    Subjective:     CC: Prenatal care    Fady Molina is a 30 y.o.  female who presents for routine prenatal care at 37w3d.  Pregnancy ROS: no leakage of fluid, pelvic pain, or vaginal bleeding.  normal fetal movement.    The following portions of the patient's history were reviewed and updated as appropriate: allergies, current medications, past family history, past medical history, obstetric history, gynecologic history, past social history, past surgical history and problem list.      Objective:  /74 (BP Location: Left arm, Patient Position: Sitting, Cuff Size: Standard)   Ht 5' 6\" (1.676 m)   Wt 76 kg (167 lb 9.6 oz)   LMP 2024 (Approximate)   BMI 27.05 kg/m²   Pregravid Weight/BMI: 65.8 kg (145 lb) (BMI 23.41)  Current Weight: 76 kg (167 lb 9.6 oz)   Total Weight Gain: 10.3 kg (22 lb 9.6 oz)   Pre-Tracie Vitals      Flowsheet Row Most Recent Value   Prenatal Assessment    Fetal Heart Rate 135   Fundal Height (cm) 34 cm   Movement Present   Presentation Vertex   Prenatal Vitals    Blood Pressure 122/74   Weight - Scale 76 kg (167 lb 9.6 oz)   Urine Albumin/Glucose    Dilation/Effacement/Station    Vaginal Drainage    Edema    LLE Edema None   RLE Edema None             General: Well appearing, no distress  Abdomen: Soft, gravid, nontender  Extremities: Non tender.  "

## 2025-03-26 NOTE — ASSESSMENT & PLAN NOTE
Has been getting serial growth u/s and APFS testing weekly for velamentous cord.  Last growth 33 wks AGA.  Today fundal height for me is less than expected, but this is my first exam of patient.  She had VICKY and NST 3/19/2025 which was normal.  Normal fetal movement.  Patient reports has growth u/s with MFM this Friday.     Interested in IOL at 39 weeks due to concern for Velamentous cord.  Reviewed with patient that any pregnant women can undergo an elective induction of labor at 39 weeks or later, depending on hospital availability.  One study showed that induction of labor of first time mothers at 39 weeks compared to waiting until 41 weeks, decreased  rate by 3%, and decreased risk of developing high blood pressure in pregnancy.  Alternatively induction of labor can often result in longer in-hospital stays overall, than allowing for spontaneous labor.  This is especially true in patients who have an unfavorable cervix and require the additional step of cervical ripening prior to induction of labor - leading often to an additional 12-24 hours in the hospital prior to delivery, during which there is continuous fetal monitoring. Discussed these pros and cons in relation to patient's desires for her delivery process, as well as the fact that she is a healthy patient with no other risk factors.  Discussed next visit we will check her cervix and make a plan for induction after 39 weeks if that is what she wishes.

## 2025-03-28 ENCOUNTER — APPOINTMENT (OUTPATIENT)
Dept: LAB | Facility: CLINIC | Age: 30
End: 2025-03-28
Payer: COMMERCIAL

## 2025-03-28 ENCOUNTER — NURSE TRIAGE (OUTPATIENT)
Dept: OTHER | Facility: OTHER | Age: 30
End: 2025-03-28

## 2025-03-28 ENCOUNTER — ULTRASOUND (OUTPATIENT)
Dept: PERINATAL CARE | Facility: OTHER | Age: 30
End: 2025-03-28
Payer: COMMERCIAL

## 2025-03-28 VITALS
HEIGHT: 66 IN | DIASTOLIC BLOOD PRESSURE: 82 MMHG | BODY MASS INDEX: 27.06 KG/M2 | SYSTOLIC BLOOD PRESSURE: 132 MMHG | WEIGHT: 168.4 LBS | HEART RATE: 86 BPM

## 2025-03-28 DIAGNOSIS — R03.0 ELEVATED BLOOD PRESSURE READING IN OFFICE WITHOUT DIAGNOSIS OF HYPERTENSION: ICD-10-CM

## 2025-03-28 DIAGNOSIS — R03.0 ELEVATED BLOOD PRESSURE READING WITHOUT DIAGNOSIS OF HYPERTENSION: Primary | ICD-10-CM

## 2025-03-28 DIAGNOSIS — Z36.89 ENCOUNTER FOR ULTRASOUND TO ASSESS FETAL GROWTH: ICD-10-CM

## 2025-03-28 DIAGNOSIS — Z3A.37 37 WEEKS GESTATION OF PREGNANCY: ICD-10-CM

## 2025-03-28 DIAGNOSIS — O43.123 VELAMENTOUS INSERTION OF UMBILICAL CORD IN THIRD TRIMESTER: Primary | ICD-10-CM

## 2025-03-28 LAB
ALBUMIN SERPL BCG-MCNC: 3.4 G/DL (ref 3.5–5)
ALP SERPL-CCNC: 138 U/L (ref 34–104)
ALT SERPL W P-5'-P-CCNC: 16 U/L (ref 7–52)
ANION GAP SERPL CALCULATED.3IONS-SCNC: 9 MMOL/L (ref 4–13)
AST SERPL W P-5'-P-CCNC: 19 U/L (ref 13–39)
BILIRUB SERPL-MCNC: 0.4 MG/DL (ref 0.2–1)
BUN SERPL-MCNC: 8 MG/DL (ref 5–25)
CALCIUM ALBUM COR SERPL-MCNC: 9.1 MG/DL (ref 8.3–10.1)
CALCIUM SERPL-MCNC: 8.6 MG/DL (ref 8.4–10.2)
CHLORIDE SERPL-SCNC: 106 MMOL/L (ref 96–108)
CO2 SERPL-SCNC: 23 MMOL/L (ref 21–32)
CREAT SERPL-MCNC: 0.53 MG/DL (ref 0.6–1.3)
CREAT UR-MCNC: 30.1 MG/DL
ERYTHROCYTE [DISTWIDTH] IN BLOOD BY AUTOMATED COUNT: 13.1 % (ref 11.6–15.1)
GFR SERPL CREATININE-BSD FRML MDRD: 126 ML/MIN/1.73SQ M
GLUCOSE SERPL-MCNC: 83 MG/DL (ref 65–140)
HCT VFR BLD AUTO: 38.5 % (ref 34.8–46.1)
HGB BLD-MCNC: 12.8 G/DL (ref 11.5–15.4)
MCH RBC QN AUTO: 30.4 PG (ref 26.8–34.3)
MCHC RBC AUTO-ENTMCNC: 33.2 G/DL (ref 31.4–37.4)
MCV RBC AUTO: 91 FL (ref 82–98)
PLATELET # BLD AUTO: 201 THOUSANDS/UL (ref 149–390)
PMV BLD AUTO: 11.1 FL (ref 8.9–12.7)
POTASSIUM SERPL-SCNC: 3.9 MMOL/L (ref 3.5–5.3)
PROT SERPL-MCNC: 6 G/DL (ref 6.4–8.4)
PROT UR-MCNC: 6.9 MG/DL
PROT/CREAT UR: 0.2 MG/G{CREAT} (ref 0–0.1)
RBC # BLD AUTO: 4.21 MILLION/UL (ref 3.81–5.12)
SODIUM SERPL-SCNC: 138 MMOL/L (ref 135–147)
WBC # BLD AUTO: 9.33 THOUSAND/UL (ref 4.31–10.16)

## 2025-03-28 PROCEDURE — 59025 FETAL NON-STRESS TEST: CPT | Performed by: OBSTETRICS & GYNECOLOGY

## 2025-03-28 PROCEDURE — 59025 FETAL NON-STRESS TEST: CPT | Performed by: NURSE PRACTITIONER

## 2025-03-28 PROCEDURE — 36415 COLL VENOUS BLD VENIPUNCTURE: CPT

## 2025-03-28 PROCEDURE — 99213 OFFICE O/P EST LOW 20 MIN: CPT | Performed by: NURSE PRACTITIONER

## 2025-03-28 PROCEDURE — 85027 COMPLETE CBC AUTOMATED: CPT

## 2025-03-28 PROCEDURE — 76816 OB US FOLLOW-UP PER FETUS: CPT | Performed by: OBSTETRICS & GYNECOLOGY

## 2025-03-28 PROCEDURE — 80053 COMPREHEN METABOLIC PANEL: CPT

## 2025-03-28 PROCEDURE — 84156 ASSAY OF PROTEIN URINE: CPT

## 2025-03-28 PROCEDURE — 82570 ASSAY OF URINE CREATININE: CPT

## 2025-03-28 NOTE — PROGRESS NOTES
"[facility]: Ms. Molina was seen today at 37w5d gestational age for NST (found under the pregnancy episode) which I reviewed the RN assessment and agree, and fetal growth ultrasound (see ultrasound report under OB procedures tab).  See ultrasound report under \"OB Procedures\" tab.    Luna CORTES    "

## 2025-03-28 NOTE — LETTER
"   Date: 3/28/2025    Libia Jaime V,   [address] [place] [address]  [address] [place] [id number]    Patient: Fady Molina   YOB: 1995   Date of Visit: 3/28/2025   Gestational age 37w5d   Nature of this communication: Routine though please note Diastolic hypertension x1 today, not sustained. Pre-E labs ordered. If further hypertension would necessitate delivery. Sign/symptoms of preE reviewed today.        This patient was seen recently in our  office.  Please see ultrasound report under \"OB Procedures\" tab.  Please don't hesitate to contact our office with any concerns or questions.      Sincerely,      Annabelle Young MD  Attending Physician, Maternal-Fetal Medicine  [facility]    "
room air

## 2025-03-28 NOTE — PROGRESS NOTES
Repeat Non-Stress Testing:    Patient verbalizes +FM. Pt denies ALL:               Leaking of fluid   Contractions   Vaginal bleeding   Decreased fetal movement    Patient is performing daily kick counts. Patient has no questions or concerns.   NST strip reviewed by SEBASTIAN Gastelum in-person.

## 2025-03-29 NOTE — TELEPHONE ENCOUNTER
Esc to on call Juan: Pt  is calling with concern for her lab results specifically her urine. She was seen today by M and had high BP in office. no symptoms. Her CMP is abnormal also

## 2025-03-29 NOTE — TELEPHONE ENCOUNTER
"FOLLOW UP: Pt for follow up appointment 3/31/2025    REASON FOR CONVERSATION: Results    SYMPTOMS: High BP at office visit with MFM    OTHER: none     DISPOSITION:  Now  Esc response from on call Dr Martinez: Based on her labs and u/s alone, she does NOT meet preE diagnosis. Her next appt is 3-31 this coming Monday.  She is asymptomatic, unless any of PreE symptoms arise, reassurance and f/u Monday. For now, she does not meet criteria. However, if she experiences any headaches, vision changes/seeing dark spots, RUQ pain, or increased in leg/feet swelling or decreased fetal movement, she needs to come to triage for eval.       Pt made aware of on call Martinez response and verbalized understanding and will call back if develops any of the above symptoms. Currently she has no symptoms. She does not have a BP cuff  at home and on call is aware of that.   Answer Assessment - Initial Assessment Questions  1. REASON FOR CALL: \"What is the main reason for your call?\" or \"How can I best help you?\"      Abnormal urine/lab  results  2. SYMPTOMS : \"Do you have any symptoms?\"       Had high BP at office but no symptoms.  3. OTHER QUESTIONS: \"Do you have any other questions?\"      none    Protocols used: Information Only Call - No Triage-Adult-    "

## 2025-03-30 ENCOUNTER — RESULTS FOLLOW-UP (OUTPATIENT)
Dept: PERINATAL CARE | Facility: CLINIC | Age: 30
End: 2025-03-30

## 2025-03-31 ENCOUNTER — HOSPITAL ENCOUNTER (INPATIENT)
Facility: HOSPITAL | Age: 30
LOS: 3 days | Discharge: HOME | End: 2025-04-03
Attending: OBSTETRICS & GYNECOLOGY | Admitting: OBSTETRICS & GYNECOLOGY
Payer: COMMERCIAL

## 2025-03-31 ENCOUNTER — ROUTINE PRENATAL (OUTPATIENT)
Dept: OBGYN CLINIC | Facility: CLINIC | Age: 30
End: 2025-03-31
Payer: COMMERCIAL

## 2025-03-31 VITALS
BODY MASS INDEX: 27.16 KG/M2 | WEIGHT: 169 LBS | HEIGHT: 66 IN | SYSTOLIC BLOOD PRESSURE: 138 MMHG | DIASTOLIC BLOOD PRESSURE: 90 MMHG

## 2025-03-31 DIAGNOSIS — Z3A.38 38 WEEKS GESTATION OF PREGNANCY: ICD-10-CM

## 2025-03-31 DIAGNOSIS — O99.820 GBS (GROUP B STREPTOCOCCUS CARRIER), +RV CULTURE, CURRENTLY PREGNANT: ICD-10-CM

## 2025-03-31 DIAGNOSIS — O13.3 GESTATIONAL HYPERTENSION, THIRD TRIMESTER: Primary | ICD-10-CM

## 2025-03-31 DIAGNOSIS — O43.123 VELAMENTOUS INSERTION OF UMBILICAL CORD IN THIRD TRIMESTER: ICD-10-CM

## 2025-03-31 PROBLEM — O26.899 PREGNANCY HEADACHE, ANTEPARTUM: Status: RESOLVED | Noted: 2024-10-29 | Resolved: 2025-03-31

## 2025-03-31 PROBLEM — O14.93 PRE-ECLAMPSIA IN THIRD TRIMESTER: Status: ACTIVE | Noted: 2025-03-31

## 2025-03-31 PROBLEM — R51.9 PREGNANCY HEADACHE, ANTEPARTUM: Status: RESOLVED | Noted: 2024-10-29 | Resolved: 2025-03-31

## 2025-03-31 PROBLEM — R03.0 ELEVATED BLOOD PRESSURE READING IN OFFICE WITHOUT DIAGNOSIS OF HYPERTENSION: Status: RESOLVED | Noted: 2025-03-28 | Resolved: 2025-03-31

## 2025-03-31 PROBLEM — Z34.90 ENCOUNTER FOR INDUCTION OF LABOR: Status: ACTIVE | Noted: 2025-03-31

## 2025-03-31 LAB
ABO GROUP BLD: NORMAL
ALBUMIN SERPL BCG-MCNC: 3.5 G/DL (ref 3.5–5)
ALP SERPL-CCNC: 133 U/L (ref 34–104)
ALT SERPL W P-5'-P-CCNC: 15 U/L (ref 7–52)
ANION GAP SERPL CALCULATED.3IONS-SCNC: 7 MMOL/L (ref 4–13)
AST SERPL W P-5'-P-CCNC: 17 U/L (ref 13–39)
BASOPHILS # BLD AUTO: 0.02 THOUSANDS/ÂΜL (ref 0–0.1)
BASOPHILS NFR BLD AUTO: 0 % (ref 0–1)
BILIRUB SERPL-MCNC: 0.38 MG/DL (ref 0.2–1)
BILIRUB UR QL STRIP: NEGATIVE
BLD GP AB SCN SERPL QL: NEGATIVE
BUN SERPL-MCNC: 7 MG/DL (ref 5–25)
CALCIUM SERPL-MCNC: 8.6 MG/DL (ref 8.4–10.2)
CHLORIDE SERPL-SCNC: 109 MMOL/L (ref 96–108)
CLARITY UR: CLEAR
CO2 SERPL-SCNC: 21 MMOL/L (ref 21–32)
COLOR UR: YELLOW
CREAT SERPL-MCNC: 0.5 MG/DL (ref 0.6–1.3)
CREAT UR-MCNC: 19.2 MG/DL
EOSINOPHIL # BLD AUTO: 0.3 THOUSAND/ÂΜL (ref 0–0.61)
EOSINOPHIL NFR BLD AUTO: 3 % (ref 0–6)
ERYTHROCYTE [DISTWIDTH] IN BLOOD BY AUTOMATED COUNT: 12.8 % (ref 11.6–15.1)
GFR SERPL CREATININE-BSD FRML MDRD: 128 ML/MIN/1.73SQ M
GLUCOSE SERPL-MCNC: 85 MG/DL (ref 65–140)
GLUCOSE UR STRIP-MCNC: NEGATIVE MG/DL
HCT VFR BLD AUTO: 38.3 % (ref 34.8–46.1)
HGB BLD-MCNC: 13.2 G/DL (ref 11.5–15.4)
HGB UR QL STRIP.AUTO: NEGATIVE
HOLD SPECIMEN: NORMAL
IMM GRANULOCYTES # BLD AUTO: 0.05 THOUSAND/UL (ref 0–0.2)
IMM GRANULOCYTES NFR BLD AUTO: 1 % (ref 0–2)
KETONES UR STRIP-MCNC: NEGATIVE MG/DL
LEUKOCYTE ESTERASE UR QL STRIP: NEGATIVE
LYMPHOCYTES # BLD AUTO: 1.63 THOUSANDS/ÂΜL (ref 0.6–4.47)
LYMPHOCYTES NFR BLD AUTO: 18 % (ref 14–44)
MCH RBC QN AUTO: 30.3 PG (ref 26.8–34.3)
MCHC RBC AUTO-ENTMCNC: 34.5 G/DL (ref 31.4–37.4)
MCV RBC AUTO: 88 FL (ref 82–98)
MONOCYTES # BLD AUTO: 0.51 THOUSAND/ÂΜL (ref 0.17–1.22)
MONOCYTES NFR BLD AUTO: 6 % (ref 4–12)
NEUTROPHILS # BLD AUTO: 6.37 THOUSANDS/ÂΜL (ref 1.85–7.62)
NEUTS SEG NFR BLD AUTO: 72 % (ref 43–75)
NITRITE UR QL STRIP: NEGATIVE
NRBC BLD AUTO-RTO: 0 /100 WBCS
PH UR STRIP.AUTO: 7 [PH]
PLATELET # BLD AUTO: 194 THOUSANDS/UL (ref 149–390)
PMV BLD AUTO: 10.4 FL (ref 8.9–12.7)
POTASSIUM SERPL-SCNC: 3.7 MMOL/L (ref 3.5–5.3)
PROT SERPL-MCNC: 6.2 G/DL (ref 6.4–8.4)
PROT UR STRIP-MCNC: NEGATIVE MG/DL
PROT UR-MCNC: 7.3 MG/DL
PROT/CREAT UR: 0.4 MG/G{CREAT} (ref 0–0.1)
RBC # BLD AUTO: 4.35 MILLION/UL (ref 3.81–5.12)
RH BLD: POSITIVE
SL AMB  POCT GLUCOSE, UA: NORMAL
SL AMB POCT URINE PROTEIN: NORMAL
SODIUM SERPL-SCNC: 137 MMOL/L (ref 135–147)
SP GR UR STRIP.AUTO: <1.005 (ref 1–1.03)
SPECIMEN EXPIRATION DATE: NORMAL
UROBILINOGEN UR STRIP-ACNC: <2 MG/DL
WBC # BLD AUTO: 8.88 THOUSAND/UL (ref 4.31–10.16)

## 2025-03-31 PROCEDURE — PNV: Performed by: OBSTETRICS & GYNECOLOGY

## 2025-03-31 PROCEDURE — 80053 COMPREHEN METABOLIC PANEL: CPT | Performed by: OBSTETRICS & GYNECOLOGY

## 2025-03-31 PROCEDURE — 81002 URINALYSIS NONAUTO W/O SCOPE: CPT | Performed by: OBSTETRICS & GYNECOLOGY

## 2025-03-31 PROCEDURE — 81003 URINALYSIS AUTO W/O SCOPE: CPT | Performed by: OBSTETRICS & GYNECOLOGY

## 2025-03-31 PROCEDURE — NC001 PR NO CHARGE: Performed by: OBSTETRICS & GYNECOLOGY

## 2025-03-31 PROCEDURE — 86780 TREPONEMA PALLIDUM: CPT | Performed by: OBSTETRICS & GYNECOLOGY

## 2025-03-31 PROCEDURE — 85025 COMPLETE CBC W/AUTO DIFF WBC: CPT | Performed by: OBSTETRICS & GYNECOLOGY

## 2025-03-31 PROCEDURE — 86850 RBC ANTIBODY SCREEN: CPT | Performed by: OBSTETRICS & GYNECOLOGY

## 2025-03-31 PROCEDURE — 82570 ASSAY OF URINE CREATININE: CPT | Performed by: OBSTETRICS & GYNECOLOGY

## 2025-03-31 PROCEDURE — 86901 BLOOD TYPING SEROLOGIC RH(D): CPT | Performed by: OBSTETRICS & GYNECOLOGY

## 2025-03-31 PROCEDURE — 86900 BLOOD TYPING SEROLOGIC ABO: CPT | Performed by: OBSTETRICS & GYNECOLOGY

## 2025-03-31 PROCEDURE — 84156 ASSAY OF PROTEIN URINE: CPT | Performed by: OBSTETRICS & GYNECOLOGY

## 2025-03-31 RX ORDER — BUTORPHANOL TARTRATE 1 MG/ML
1 INJECTION, SOLUTION INTRAMUSCULAR; INTRAVENOUS
Status: DISCONTINUED | OUTPATIENT
Start: 2025-03-31 | End: 2025-04-01

## 2025-03-31 RX ORDER — DIPHENHYDRAMINE HYDROCHLORIDE 50 MG/ML
25 INJECTION, SOLUTION INTRAMUSCULAR; INTRAVENOUS EVERY 6 HOURS PRN
Status: DISCONTINUED | OUTPATIENT
Start: 2025-03-31 | End: 2025-04-03 | Stop reason: HOSPADM

## 2025-03-31 RX ORDER — ONDANSETRON 2 MG/ML
4 INJECTION INTRAMUSCULAR; INTRAVENOUS EVERY 6 HOURS PRN
Status: DISCONTINUED | OUTPATIENT
Start: 2025-03-31 | End: 2025-04-03 | Stop reason: HOSPADM

## 2025-03-31 RX ORDER — CALCIUM CARBONATE 500 MG/1
1000 TABLET, CHEWABLE ORAL 3 TIMES DAILY PRN
Status: DISCONTINUED | OUTPATIENT
Start: 2025-03-31 | End: 2025-04-03 | Stop reason: HOSPADM

## 2025-03-31 RX ORDER — OXYTOCIN/RINGER'S LACTATE 30/500 ML
1-30 PLASTIC BAG, INJECTION (ML) INTRAVENOUS
Status: DISCONTINUED | OUTPATIENT
Start: 2025-03-31 | End: 2025-04-01

## 2025-03-31 RX ORDER — NIFEDIPINE 30 MG/1
30 TABLET, EXTENDED RELEASE ORAL DAILY
Status: DISCONTINUED | OUTPATIENT
Start: 2025-03-31 | End: 2025-04-03 | Stop reason: HOSPADM

## 2025-03-31 RX ORDER — SODIUM CHLORIDE, SODIUM LACTATE, POTASSIUM CHLORIDE, CALCIUM CHLORIDE 600; 310; 30; 20 MG/100ML; MG/100ML; MG/100ML; MG/100ML
125 INJECTION, SOLUTION INTRAVENOUS CONTINUOUS
Status: DISCONTINUED | OUTPATIENT
Start: 2025-03-31 | End: 2025-04-01

## 2025-03-31 RX ORDER — BUPIVACAINE HYDROCHLORIDE 2.5 MG/ML
30 INJECTION, SOLUTION EPIDURAL; INFILTRATION; INTRACAUDAL; PERINEURAL ONCE AS NEEDED
Status: DISCONTINUED | OUTPATIENT
Start: 2025-03-31 | End: 2025-04-01

## 2025-03-31 RX ADMIN — SODIUM CHLORIDE, SODIUM LACTATE, POTASSIUM CHLORIDE, AND CALCIUM CHLORIDE 125 ML/HR: .6; .31; .03; .02 INJECTION, SOLUTION INTRAVENOUS at 12:21

## 2025-03-31 RX ADMIN — SODIUM CHLORIDE 2.5 MILLION UNITS: 9 INJECTION, SOLUTION INTRAVENOUS at 20:50

## 2025-03-31 RX ADMIN — Medication 2 MILLI-UNITS/MIN: at 13:16

## 2025-03-31 RX ADMIN — NIFEDIPINE 30 MG: 30 TABLET, FILM COATED, EXTENDED RELEASE ORAL at 21:53

## 2025-03-31 RX ADMIN — SODIUM CHLORIDE 5 MILLION UNITS: 0.9 INJECTION, SOLUTION INTRAVENOUS at 12:15

## 2025-03-31 RX ADMIN — CALCIUM CARBONATE (ANTACID) CHEW TAB 500 MG 1000 MG: 500 CHEW TAB at 23:29

## 2025-03-31 RX ADMIN — SODIUM CHLORIDE 2.5 MILLION UNITS: 9 INJECTION, SOLUTION INTRAVENOUS at 16:38

## 2025-03-31 NOTE — ASSESSMENT & PLAN NOTE
- Check admission CMP and urine Pr:Cr along with admission labs  - monitor BPs  - if severe range blood pressures and/or presence of severe symptoms, will initiate magnesium for prophyalxis

## 2025-03-31 NOTE — OB LABOR/OXYTOCIN SAFETY PROGRESS
Oxytocin Safety Progress Check Note - Fady Gil Molina 30 y.o. female MRN: [id number]    Unit/Bed#: -01 Encounter: [id number]    Dose (gm-units/min) Oxytocin: 8 gm-units/min  Contraction Frequency (minutes): 1.5-2.5  Contraction Intensity: Mild  Uterine Activity Characteristics: Irregular  Cervical Dilation: 5-6        Cervical Effacement: 80  Fetal Station: -2  Baseline Rate (FHR): 130 bpm  Fetal Heart Rate (FHT): 160 BPM  FHR Category: I           Vital Signs:   Vitals:    03/31/25 1833   BP: 136/98   Pulse: 85   Resp:    Temp:        Notes/comments: FB out.  Continue pitocin titration.  Patient comfortable at this time.      Mary Man MD 3/31/2025 7:35 PM

## 2025-03-31 NOTE — ASSESSMENT & PLAN NOTE
- Admit to L&D, IVF and labs  - EFM Category 1, TOCO irregular  - Bedside ultrasound confirms vertex  - SVE: 2/80/-3, balloon placed  - will initiate pitocin, IOL consent discussed and signed in office and reaffirmed consent with patient now  - GBS positive  - EFW: 3400g by Leopold's  - discussed methods of cervical ripening and induction and patient verbalizes consent

## 2025-03-31 NOTE — ASSESSMENT & PLAN NOTE
Now meets criteria for gHTN, had one elevated BP at [facility] last week and today BP is 138/90. Will send to L&D for IOL and labs. Consent signed for IOL

## 2025-03-31 NOTE — PROGRESS NOTES
"Routine Prenatal Visit  [facility] - [address], [address], [place] [address]    Assessment/Plan:  Fady is a 30 y.o. year old  at 38w1d who presents for routine prenatal visit.     1. Gestational hypertension, third trimester  Assessment & Plan:  Now meets criteria for gHTN, had one elevated BP at [facility] last week and today BP is 138/90. Will send to L&D for IOL  2. Velamentous insertion of umbilical cord in third trimester  3. GBS (group B Streptococcus carrier), +RV culture, currently pregnant  Assessment & Plan:  Treat in active labor  4. 38 weeks gestation of pregnancy  -     POCT urine dip        Subjective:   Fady Molina is a 30 y.o.  who presents for routine prenatal care at 38w1d.  Complaints today: Mild headache  LOF: -; VB: -; Contractions: -; FM: +    Objective:  /90 (BP Location: Right arm, Patient Position: Sitting, Cuff Size: Standard)   Ht 5' 6\" (1.676 m)   Wt 76.7 kg (169 lb)   LMP 2024 (Approximate)   BMI 27.28 kg/m²     General: Well appearing, no distress  Respiratory: Unlabored breathing  Abdomen: Soft, gravid, nontender  Extremities: Warm and well perfused.  Non tender.    Pregravid Weight/BMI: 65.8 kg (145 lb) (BMI 23.41)  Current Weight: 76.7 kg (169 lb)   Total Weight Gain: 10.9 kg (24 lb)     Pre- Vitals      Flowsheet Row Most Recent Value   Prenatal Assessment    Fetal Heart Rate 139   Fundal Height (cm) 38 cm   Movement Present   Presentation Vertex   Prenatal Vitals    Blood Pressure 138/90   Weight - Scale 76.7 kg (169 lb)   Urine Albumin/Glucose    Dilation/Effacement/Station    Cervical Dilation 1.5   Cervical Effacement 70   Fetal Station -3   Vaginal Drainage    Edema              Libia Jaime DO  3/31/2025 10:18 AM     "

## 2025-03-31 NOTE — OB LABOR/OXYTOCIN SAFETY PROGRESS
Oxytocin Safety Progress Check Note - Fady Gil Molina 30 y.o. female MRN: [id number]    Unit/Bed#: -01 Encounter: [id number]    Dose (gm-units/min) Oxytocin: 4 gm-units/min  Contraction Frequency (minutes): irritability  Contraction Intensity: Mild  Uterine Activity Characteristics: Irregular  Cervical Dilation: 2        Cervical Effacement: 80  Fetal Station: -3  Baseline Rate (FHR): 135 bpm  Fetal Heart Rate (FHT): 160 BPM  FHR Category: 1               Vital Signs:    Vitals:    25 1327   BP: 141/90   Pulse: 98   Resp:    Temp:        Notes/comments:   31 yo  at 38+1 IOL for GHTN, now meeting criteria for PEC without SF given elevated urine Pr:Cr 0.4  - balloon in place, and pitocin per protocol  - penicillin for GBS +  - BPs stable with only mild elevation at this time; will monitor    - notified patient of preeclampsia diagnosis and possibility of antihypertensive medication including magnesium for prophylaxis if indicated. Patient verbalized understanding    Sully Matos MD 3/31/2025 2:27 PM

## 2025-03-31 NOTE — H&P
History and Physical- OB  Fady Molina 30 y.o. female MRN: [id number]  Unit/Bed#: -01 Encounter: [id number]        ASSESSMENT/PLAN  Fady Molina is a 30 y.o.  at 38w1d presents for scheduled IOL for newly diagnosed gestational HTN.    Assessment & Plan  Encounter for induction of labor  - Admit to L&D, IVF and labs  - EFM Category 1, TOCO irregular  - Bedside ultrasound confirms vertex  - SVE: /-3, balloon placed  - will initiate pitocin, IOL consent discussed and signed in office and reaffirmed consent with patient now  - GBS positive  - EFW: 3400g by Leopold's  - discussed methods of cervical ripening and induction and patient verbalizes consent    Gestational hypertension, third trimester  - Check admission CMP and urine Pr:Cr along with admission labs  - monitor BPs  - if severe range blood pressures and/or presence of severe symptoms, will initiate magnesium for prophyalxis  GBS (group B Streptococcus carrier), +RV culture, currently pregnant  - will initiate penicillin for GBS prophylaxis  Velamentous insertion of umbilical cord in third trimester          She is a patient of [facility]  D/w Dr. Man, on call OBGYN Attending Physician  ______________    SUBJECTIVE    JADA: Estimated Date of Delivery: 25    HPI:  30 y.o.  38w1d presents directly from office for IOL due to newly diagnosed GHTN. Denies headache, visual changes, epigastric pain.   GBS positive. Expecting girl.    Prenatal labs:  Lab Results   Component Value Date    ABO O 2024    RH Positive 2024    ABS Normal 2024    HGB 12.8 2025     2025    EXTRUBELIGGQ immune 2024    RPR Non Reactive 2025    HEPBSAG neg 2024    HEPCAB Non Reactive 2024    HIVAGAB Non-Reactive 2024    GC negative 10/02/2024    [id number] 2025       Her obstetrical history is significant for    Patient Active Problem List  "  Diagnosis    Migraine with aura    Pregnancy headache, antepartum    38 weeks gestation of pregnancy    Velamentous insertion of umbilical cord in third trimester    GBS (group B Streptococcus carrier), +RV culture, currently pregnant    Gestational hypertension, third trimester    Encounter for induction of labor         Past Medical History:   Diagnosis Date    Migraines     Relieved with Chiropractor adjusments       Past Surgical History:   Procedure Laterality Date    WISDOM TOOTH EXTRACTION             ROS:  Constitutional: Negative  Respiratory: Negative  Cardiovascular: Negative    Gastrointestinal: Negative     Physical Exam  General: Well appearing, no distress  Respiratory: CTAB   Cardiovascular: Regular rate  Abdomen: Soft, gravid, nontender    Extremities: Warm and well perfused.  Non tender.  SVE: 2/80/-3, transcervical balloon placed, patient tolerated well      OBJECTIVE:  LMP 07/13/2024 (Approximate)   There is no height or weight on file to calculate BMI.  Labs:   Recent Results (from the past 24 hours)   POCT urine dip    Collection Time: 03/31/25 10:14 AM   Result Value Ref Range    POCT URINE PROTEIN trace     GLUCOSE, UA neg          SVE:   Cervical Dilation: 2  Cervical Effacement: 80  Cervical Consistency: Soft  Fetal Station: -3  Position:  (balloon placed)  Method: Manual  OB Examiner: MD Shawna      FHT: 150 = moderate variability + accels no decels    TOCO:     Contraction Frequency (minutes): irregular    IMAGING: bedside US confirms vertex      Sully MD Shawna  OB/GYN   3/31/2025  11:42 AM      Portions of the record may have been created with voice recognition software.  Occasional wrong word or \"sound a like\" substitutions may have occurred due to the inherent limitations of voice recognition software.  Read the chart carefully and recognize, using context, where substitutions have occurred  "

## 2025-03-31 NOTE — DISCHARGE INSTR - AVS FIRST PAGE
You should take your blood pressure at home 1-2x daily.     Normal-range blood pressures in the postpartum period are less than 140 for the top number (systolic) AND less than 90 for the bottom number (diastolic).    Mildly elevated blood pressures in the postpartum period are 140-159 systolic OR  diastolic. Some mildly elevated blood pressures are expected due to your diagnosis of preeclampsia.     If your blood pressures are persistently in the 150s systolic or 100s diastolic, you should call the office, as initiation/titration of blood pressure medication may be necessary.    Severe-range blood pressures in the postpartum period are 160 or greater systolic  or greater diastolic. You should NOT have any severely elevated blood pressures at home.     If your blood pressure is greater than 160/110, you should promptly call [facility] at [phone] and proceed to the hospital for evaluation for pre-eclampsia.    Symptoms of Pre-Eclampsia include headache that does not go away with Tylenol, changes in vision such as blurred vision or spots in your vision, chest pain, shortness of breath, pain in the upper right side of your abdomen, or sudden onset or worsening of swelling.    If you develop any of the above symptoms, you should promptly call [facility] [place] at [phone] and present to the hospital for evaluation of pre-eclampsia.

## 2025-04-01 ENCOUNTER — ANESTHESIA (INPATIENT)
Dept: ANESTHESIOLOGY | Facility: HOSPITAL | Age: 30
End: 2025-04-01
Payer: COMMERCIAL

## 2025-04-01 ENCOUNTER — ANESTHESIA EVENT (INPATIENT)
Dept: ANESTHESIOLOGY | Facility: HOSPITAL | Age: 30
End: 2025-04-01
Payer: COMMERCIAL

## 2025-04-01 LAB
BASE EXCESS BLDCOA CALC-SCNC: -3.7 MMOL/L (ref 3–11)
BASE EXCESS BLDCOV CALC-SCNC: -2 MMOL/L (ref 1–9)
HCO3 BLDCOA-SCNC: 24.5 MMOL/L (ref 17.3–27.3)
HCO3 BLDCOV-SCNC: 22.8 MMOL/L (ref 12.2–28.6)
O2 CT VFR BLDCOA CALC: 3.5 ML/DL
OXYHGB MFR BLDCOA: 17.8 %
OXYHGB MFR BLDCOV: 90.3 %
PCO2 BLDCOA: 57.7 MM[HG] (ref 30–60)
PCO2 BLDCOV: 39.1 MM HG (ref 27–43)
PH BLDCOA: 7.25 [PH] (ref 7.23–7.43)
PH BLDCOV: 7.38 [PH] (ref 7.19–7.49)
PO2 BLDCOA: 12.9 MM HG (ref 5–25)
PO2 BLDCOV: 50 MM HG (ref 15–45)
SAO2 % BLDCOV: 18.6 ML/DL
TREPONEMA PALLIDUM IGG+IGM AB [PRESENCE] IN SERUM OR PLASMA BY IMMUNOASSAY: NORMAL

## 2025-04-01 PROCEDURE — 59400 OBSTETRICAL CARE: CPT | Performed by: OBSTETRICS & GYNECOLOGY

## 2025-04-01 PROCEDURE — 88307 TISSUE EXAM BY PATHOLOGIST: CPT | Performed by: STUDENT IN AN ORGANIZED HEALTH CARE EDUCATION/TRAINING PROGRAM

## 2025-04-01 PROCEDURE — 0KQM0ZZ REPAIR PERINEUM MUSCLE, OPEN APPROACH: ICD-10-PCS | Performed by: OBSTETRICS & GYNECOLOGY

## 2025-04-01 PROCEDURE — 82805 BLOOD GASES W/O2 SATURATION: CPT | Performed by: OBSTETRICS & GYNECOLOGY

## 2025-04-01 RX ORDER — SIMETHICONE 80 MG
80 TABLET,CHEWABLE ORAL 4 TIMES DAILY PRN
Status: DISCONTINUED | OUTPATIENT
Start: 2025-04-01 | End: 2025-04-03 | Stop reason: HOSPADM

## 2025-04-01 RX ORDER — BENZOCAINE/MENTHOL 6 MG-10 MG
1 LOZENGE MUCOUS MEMBRANE DAILY PRN
Status: DISCONTINUED | OUTPATIENT
Start: 2025-04-01 | End: 2025-04-03 | Stop reason: HOSPADM

## 2025-04-01 RX ORDER — TRANEXAMIC ACID 10 MG/ML
INJECTION, SOLUTION INTRAVENOUS
Status: COMPLETED
Start: 2025-04-01 | End: 2025-04-01

## 2025-04-01 RX ORDER — NIFEDIPINE 30 MG/1
30 TABLET, EXTENDED RELEASE ORAL DAILY
Status: DISCONTINUED | OUTPATIENT
Start: 2025-04-01 | End: 2025-04-01 | Stop reason: SDUPTHER

## 2025-04-01 RX ORDER — LIDOCAINE HYDROCHLORIDE AND EPINEPHRINE 15; 5 MG/ML; UG/ML
INJECTION, SOLUTION EPIDURAL AS NEEDED
Status: DISCONTINUED | OUTPATIENT
Start: 2025-04-01 | End: 2025-04-03 | Stop reason: HOSPADM

## 2025-04-01 RX ORDER — CALCIUM CARBONATE 500 MG/1
1000 TABLET, CHEWABLE ORAL DAILY PRN
Status: DISCONTINUED | OUTPATIENT
Start: 2025-04-01 | End: 2025-04-01 | Stop reason: SDUPTHER

## 2025-04-01 RX ORDER — BUPIVACAINE HYDROCHLORIDE 2.5 MG/ML
INJECTION, SOLUTION EPIDURAL; INFILTRATION; INTRACAUDAL; PERINEURAL AS NEEDED
Status: DISCONTINUED | OUTPATIENT
Start: 2025-04-01 | End: 2025-04-03 | Stop reason: HOSPADM

## 2025-04-01 RX ORDER — ACETAMINOPHEN 325 MG/1
650 TABLET ORAL EVERY 4 HOURS PRN
Status: DISCONTINUED | OUTPATIENT
Start: 2025-04-01 | End: 2025-04-03 | Stop reason: HOSPADM

## 2025-04-01 RX ORDER — CARBOPROST TROMETHAMINE 250 UG/ML
INJECTION, SOLUTION INTRAMUSCULAR
Status: COMPLETED
Start: 2025-04-01 | End: 2025-04-01

## 2025-04-01 RX ORDER — ROPIVACAINE HYDROCHLORIDE 2 MG/ML
INJECTION, SOLUTION EPIDURAL; INFILTRATION; PERINEURAL CONTINUOUS PRN
Status: DISCONTINUED | OUTPATIENT
Start: 2025-04-01 | End: 2025-04-03 | Stop reason: HOSPADM

## 2025-04-01 RX ORDER — OXYTOCIN/RINGER'S LACTATE 30/500 ML
250 PLASTIC BAG, INJECTION (ML) INTRAVENOUS ONCE
Status: COMPLETED | OUTPATIENT
Start: 2025-04-01 | End: 2025-04-01

## 2025-04-01 RX ORDER — DOCUSATE SODIUM 100 MG/1
100 CAPSULE, LIQUID FILLED ORAL 2 TIMES DAILY
Status: DISCONTINUED | OUTPATIENT
Start: 2025-04-01 | End: 2025-04-03 | Stop reason: HOSPADM

## 2025-04-01 RX ORDER — LIDOCAINE HYDROCHLORIDE 10 MG/ML
INJECTION, SOLUTION EPIDURAL; INFILTRATION; INTRACAUDAL; PERINEURAL AS NEEDED
Status: DISCONTINUED | OUTPATIENT
Start: 2025-04-01 | End: 2025-04-03 | Stop reason: HOSPADM

## 2025-04-01 RX ORDER — ONDANSETRON 2 MG/ML
4 INJECTION INTRAMUSCULAR; INTRAVENOUS EVERY 8 HOURS PRN
Status: DISCONTINUED | OUTPATIENT
Start: 2025-04-01 | End: 2025-04-01 | Stop reason: SDUPTHER

## 2025-04-01 RX ORDER — IBUPROFEN 600 MG/1
600 TABLET, FILM COATED ORAL EVERY 6 HOURS
Status: DISCONTINUED | OUTPATIENT
Start: 2025-04-01 | End: 2025-04-03 | Stop reason: HOSPADM

## 2025-04-01 RX ORDER — LOPERAMIDE HYDROCHLORIDE 2 MG/1
2 CAPSULE ORAL ONCE
Status: COMPLETED | OUTPATIENT
Start: 2025-04-01 | End: 2025-04-01

## 2025-04-01 RX ORDER — DIPHENHYDRAMINE HYDROCHLORIDE 50 MG/ML
25 INJECTION, SOLUTION INTRAMUSCULAR; INTRAVENOUS EVERY 6 HOURS PRN
Status: DISCONTINUED | OUTPATIENT
Start: 2025-04-01 | End: 2025-04-03 | Stop reason: HOSPADM

## 2025-04-01 RX ADMIN — SODIUM CHLORIDE 2.5 MILLION UNITS: 9 INJECTION, SOLUTION INTRAVENOUS at 01:00

## 2025-04-01 RX ADMIN — Medication 250 MILLI-UNITS/MIN: at 07:07

## 2025-04-01 RX ADMIN — CARBOPROST TROMETHAMINE 250 MCG: 250 INJECTION, SOLUTION INTRAMUSCULAR at 06:58

## 2025-04-01 RX ADMIN — ROPIVACAINE HYDROCHLORIDE: 2 INJECTION, SOLUTION EPIDURAL; INFILTRATION at 03:15

## 2025-04-01 RX ADMIN — IBUPROFEN 600 MG: 600 TABLET ORAL at 08:29

## 2025-04-01 RX ADMIN — IBUPROFEN 600 MG: 600 TABLET ORAL at 20:54

## 2025-04-01 RX ADMIN — ACETAMINOPHEN 650 MG: 325 TABLET, FILM COATED ORAL at 21:56

## 2025-04-01 RX ADMIN — LIDOCAINE HYDROCHLORIDE 3 ML: 10 INJECTION, SOLUTION EPIDURAL; INFILTRATION; INTRACAUDAL; PERINEURAL at 03:07

## 2025-04-01 RX ADMIN — ONDANSETRON 4 MG: 2 INJECTION, SOLUTION INTRAMUSCULAR; INTRAVENOUS at 04:08

## 2025-04-01 RX ADMIN — BUPIVACAINE HYDROCHLORIDE 5 ML: 2.5 INJECTION, SOLUTION EPIDURAL; INFILTRATION; INTRACAUDAL; PERINEURAL at 03:10

## 2025-04-01 RX ADMIN — NIFEDIPINE 30 MG: 30 TABLET, FILM COATED, EXTENDED RELEASE ORAL at 08:29

## 2025-04-01 RX ADMIN — Medication 250 MILLI-UNITS/MIN: at 07:00

## 2025-04-01 RX ADMIN — ONDANSETRON 4 MG: 2 INJECTION, SOLUTION INTRAMUSCULAR; INTRAVENOUS at 10:39

## 2025-04-01 RX ADMIN — LIDOCAINE HYDROCHLORIDE,EPINEPHRINE BITARTRATE 3 ML: 15; .005 INJECTION, SOLUTION EPIDURAL; INFILTRATION; INTRACAUDAL; PERINEURAL at 03:12

## 2025-04-01 RX ADMIN — ACETAMINOPHEN 650 MG: 325 TABLET, FILM COATED ORAL at 10:35

## 2025-04-01 RX ADMIN — ROPIVACAINE HYDROCHLORIDE 12 ML/HR: 2 INJECTION, SOLUTION EPIDURAL; INFILTRATION at 03:21

## 2025-04-01 RX ADMIN — SODIUM CHLORIDE 2.5 MILLION UNITS: 9 INJECTION, SOLUTION INTRAVENOUS at 05:18

## 2025-04-01 RX ADMIN — TRANEXAMIC ACID 1000 MG: 10 INJECTION, SOLUTION INTRAVENOUS at 06:58

## 2025-04-01 RX ADMIN — LOPERAMIDE HYDROCHLORIDE 2 MG: 2 CAPSULE ORAL at 08:29

## 2025-04-01 RX ADMIN — BUPIVACAINE HYDROCHLORIDE 3 ML: 2.5 INJECTION, SOLUTION EPIDURAL; INFILTRATION; INTRACAUDAL; PERINEURAL at 03:18

## 2025-04-01 RX ADMIN — IBUPROFEN 600 MG: 600 TABLET ORAL at 14:08

## 2025-04-01 NOTE — ANESTHESIA PREPROCEDURE EVALUATION
Procedure:  LABOR ANALGESIA    Relevant Problems   CARDIO   (+) Migraine with aura   (+) Pre-eclampsia in third trimester   (+) Velamentous insertion of umbilical cord in third trimester      GYN   (+) 38 weeks gestation of pregnancy      NEURO/PSYCH   (+) Migraine with aura        Physical Exam    Airway    Mallampati score: II  TM Distance: >3 FB  Neck ROM: full     Dental   No notable dental hx     Cardiovascular      Pulmonary      Other Findings  post-pubertal.      Anesthesia Plan  ASA Score- 2     Anesthesia Type- epidural with ASA Monitors.         Additional Monitors:     Airway Plan:            Plan Factors-    Chart reviewed.   Existing labs reviewed. Patient summary reviewed.    Patient is not a current smoker.              Induction-     Postoperative Plan-     Perioperative Resuscitation Plan - Level 1 - Full Code.       Informed Consent- Anesthetic plan and risks discussed with patient.        NPO Status:  No vitals data found for the desired time range.

## 2025-04-01 NOTE — ANESTHESIA PROCEDURE NOTES
Epidural Block    Patient location during procedure: OB/L&D  Start time: 4/1/2025 3:11 AM  Reason for block: procedure for pain and at surgeon's request  Staffing  Performed by: Renato Pelaez DO  Authorized by: Renato Pelaez DO    Preanesthetic Checklist  Completed: patient identified, IV checked, site marked, risks and benefits discussed, surgical consent, monitors and equipment checked, pre-op evaluation and timeout performed  Epidural  Patient position: sitting  Prep: ChloraPrep and site prepped and draped  Sedation Level: no sedation  Patient monitoring: continuous pulse oximetry, frequent blood pressure checks and heart rate  Approach: midline  Location: lumbar, L3-4  Injection technique: DEVONTE air  Needle  Needle type: Tuohy   Needle gauge: 17 G  Needle insertion depth: 5 cm  Catheter type: spring wound  Catheter size: 19 G  Catheter at skin depth: 10 cm  Catheter securement method: clear occlusive dressing, stabilization device and tape  Test dose: negative  Assessment  Number of attempts: 1negative aspiration for CSF, negative aspiration for heme and no paresthesia on injection  patient tolerated the procedure well with no immediate complications

## 2025-04-01 NOTE — OB LABOR/OXYTOCIN SAFETY PROGRESS
Oxytocin Safety Progress Check Note - Fady Gil Molina 30 y.o. female MRN: [id number]    Unit/Bed#: -01 Encounter: [id number]    Dose (gm-units/min) Oxytocin: 20 gm-units/min  Contraction Frequency (minutes): 2-3  Contraction Intensity: Mild  Uterine Activity Characteristics: Regular  Cervical Dilation: 5-6        Cervical Effacement: 80  Fetal Station: -2  Baseline Rate (FHR): 115 bpm  Fetal Heart Rate (FHT): 160 BPM  FHR Category: 1               Vital Signs:    Vitals:    03/31/25 2346   BP: 139/91   Pulse: 80   Resp: 18   Temp: 97.9 °F (36.6 °C)       Notes/comments:         Sully Matos MD 4/1/2025 2:15 AM

## 2025-04-01 NOTE — PLAN OF CARE
Problem: POSTPARTUM  Goal: Experiences normal postpartum course  Description: INTERVENTIONS:- Monitor maternal vital signs- Assess uterine involution and lochia  Outcome: Progressing  Goal: Appropriate maternal -  bonding  Description: INTERVENTIONS:- Identify family support- Assess for appropriate maternal/infant bonding -Encourage maternal/infant bonding opportunities- Referral to  or  as needed  Outcome: Progressing  Goal: Establishment of infant feeding pattern  Description: INTERVENTIONS:- Assess breast/bottle feeding- Refer to lactation as needed  Outcome: Progressing

## 2025-04-01 NOTE — L&D DELIVERY NOTE
Delivery Summary - OB/GYN   Fady Sauerr 30 y.o. female MRN: [id number]  Unit/Bed#:  215-01 Encounter: [id number]    Pre-delivery Diagnosis:   1. 38w2d pregnancy  2. Preeclampsia without severe features  3. Velamentous cord insertion    Post-delivery Diagnosis: same, delivered    Attending: Mary Man MD    Assistant(s): none    Procedure: Spontaneous vaginal delivery with repair of 2nd degree midline perineal laceration and right labial laceration    Anesthesia: epidural    Estimated Blood Loss:  672 mL    Specimens:   1. Arterial and venous cord gases  2. Cord blood  3. Segment of umbilical cord  4. Placenta to pathology     Complications:  None apparent    Findings:  1. Viable female  delivered on 25 at 0642 weight pending;  Apgar scores of 8 at one minute and 6 at five minutes.   2. Spontaneous delivery of placenta with velamentous insertion of 3-vessel cord  3. No nuchal cord  4. 2nd degree perineal laceration, repaired with 2-0Vicryl       Disposition: Patient tolerated the procedure well and was recovering in labor and delivery room with family and  before being transferred to the post-partum floor.                 Procedure Details     Description of procedure    After pushing effectively on 25 at 0642 patient delivered a viable female , weight pending, Apgars of 6 (1 min) and 8 (5 min). The fetal vertex delivered spontaneously. There was no nuchal cord. The anterior shoulder delivered atraumatically with maternal expulsive forces and the assistance of downward traction. The posterior shoulder delivered with maternal expulsive forces and the assistance of upward traction. The remainder of the fetus delivered spontaneously.     Upon delivery, the infant was placed on the mothers abdomen and the cord was clamped and cut. The infant was noted to cry spontaneously and was moving all extremities appropriately. There was no evidence for injury. Awaiting NICU  team evaluated the  at bedside.  She was eventually taken to the NICU due to respiratory distress. Arterial and venous cord blood gases and cord blood was collected for analysis. These were promptly sent to the lab. In the immediate post-partum, 30 units of IV pitocin was administered and the uterus was noted to contract down well with massage and pitocin. The placenta delivered spontaneously at 0650 and was noted to have a  velamentous insertion.   There was moderate atony that required massage, TXA and Hemabate.    The vagina, cervix, and perineum were inspected and there was noted to be lacerations noted as above.    Laceration Repair  Patient was comfortable with epidural at that time. The lacerations were closed in the usual running fashion with 0 vicryl to reapproximate the lacerations. Good hemostasis was confirmed at the conclusion of this procedure.    At the conclusion of the delivery, all needle, sponge, and instrument counts were noted to be correct. Patient tolerated the procedure well and was allowed to recover in labor and delivery room with family before being transferred to the post-partum floor. I was present and participated in all key portions of the case.  No qualified resident was available to assist.

## 2025-04-01 NOTE — OB LABOR/OXYTOCIN SAFETY PROGRESS
Oxytocin Safety Progress Check Note - Fady Gil Molina 30 y.o. female MRN: [id number]    Unit/Bed#: -01 Encounter: [id number]    Dose (gm-units/min) Oxytocin: 14 gm-units/min  Contraction Frequency (minutes): 1.5-3  Contraction Intensity: Mild  Uterine Activity Characteristics: Regular  Cervical Dilation: 5-6        Cervical Effacement: 80  Fetal Station: -2  Baseline Rate (FHR): 120 bpm  Fetal Heart Rate (FHT): 160 BPM  FHR Category: 1               Vital Signs:    Vitals:    03/31/25 2143   BP: 159/94   Pulse:    Resp:    Temp:        Notes/comments:   - Procardia XL 30 mg po started for blood pressure  - continue pitocin per protocol      Sully Matos MD 3/31/2025 11:02 PM

## 2025-04-01 NOTE — OB LABOR/OXYTOCIN SAFETY PROGRESS
05/29/19 1000   Presenting Information and Cognitive Status   Alertness / Attention Span Attends to interview   Orientation Person;Place;Situation;Time   Mood / Affect Blunted / Depressed   Physical Appearance Lacks attention   Self Esteem Poor   Memory Appears functional   Comprehension Comprehension interview questions   Insight / Judgement Demonstrates insight into illness;Impaired judgement   Verbal Communication / Speech Makes needs known   Psychomotor Activity Slowed   Reading Functional   Writing Functional   Calculation Performs simple money transactions accurately   Follows Directions Verbal;Written   Independent Living Skills   Living Situation Pt reports living alone in an apartment   Support Persons daughter and neighbors   Self Cares   Feeding Pt reports being independent in his self cares   Bathing Pt reports being capable   Grooming Pt reports being capable   Dressing Pt reports being capable   Oral Hygiene Pt reports being capable   Toileting Pt reports being independent   Mobility Pt reports \"stumbling\" then laughs.  Pt states he does not use any adaptive equipment to walk safely despite having left side weakness from a stroke he had in November   Medication Management Per chart review pt stated that he takes it most of the time but not \"when I'm drunk\"   Functional Communication Pt is somewhat sarcastic but cooperative during interview.     Vocational   Level of Education Completed Pt reports completing technical college in [employer]   Are You Retired? Yes   When Did You Retire? 6 years ago   Do You Have a Disability? Yes   What is the Reason for Your Disability? physical disability: pt had a stroke    Are You a Student? No   Household Management   Meal Preparation Pt reports being capable in his household management tasks   Food Shopping Pt reports being capable in his household management tasks   Laundry Pt reports being capable in his household management tasks   Housekeeping Pt reports being  Oxytocin Safety Progress Check Note - Fady Gil Molina 30 y.o. female MRN: [id number]    Unit/Bed#: -01 Encounter: [id number]    Dose (gm-units/min) Oxytocin: 14 gm-units/min  Contraction Frequency (minutes): 2-3  Contraction Intensity: Mild  Uterine Activity Characteristics: Regular  Cervical Dilation: 5-6        Cervical Effacement: 80  Fetal Station: -2  Baseline Rate (FHR): 125 bpm  Fetal Heart Rate (FHT): 160 BPM  FHR Category:  1               Vital Signs:    Vitals:    25 2346   BP: 139/91   Pulse: 80   Resp: 18   Temp: 97.9 °F (36.6 °C)       Notes/comments:     1 yo  at 38+1 IOL for GHTN, now meeting criteria for PEC without SF given elevated urine Pr:Cr 0.4  - continue pitocin per protocol  - continue penicillin for GBS +  - BPs improved with administration of Procardia XL 30 mg   - will consider AROm following epidural; patient not ready for epidural yet    Sully Matos MD 2025 12:39 AM       capable in his household management tasks   Yard Work Pt reports being capable in his household management tasks   Transportation Pt drives   Money Management Pt reports tending adequately to his finances   Safety Procedures Please see care plan, pt seeking ETOH detox   Parenting Pt has a daughter and a 15 yo grandaughter   Physical Limitations   Any Physical Limitations? Pt has history of a stroke with left side weakness   Social / Leisure Activities   Any Social / Leisure Activities Pt states he use to play the guitar but his hand is now paralized.   Plan   Problem Areas Identified Compromised safety;Medication management;Leisure planning;Diminished self-esteem;Inappropriate activity level;Ineffective coping;Functional motor disability;Ineffective time management   Patient's Strengths: Pt identifies having housing and income sources along with supportive neighbors and daughter   Patient's Stated Goal: \"to sober the hell up\"   Occupational Therapy Plan: Pt may benefit from OT services.  To encourag pt to engage in the OT groups and to focus on building coping skills around the above identified problem areas.   OT Therapist Name Heaven Arango OTR   Date of OT Screening 05/29/19

## 2025-04-01 NOTE — OB LABOR/OXYTOCIN SAFETY PROGRESS
Oxytocin Safety Progress Check Note - Fady Gil Molina 30 y.o. female MRN: [id number]    Unit/Bed#: -01 Encounter: [id number]    Dose (gm-units/min) Oxytocin: 20 gm-units/min  Contraction Frequency (minutes): 2-3  Contraction Intensity: Mild  Uterine Activity Characteristics: Irregular  Cervical Dilation: 10  Dilation Complete Date: 04/01/25  Dilation Complete Time: 0545  Cervical Effacement: 100  Fetal Station: 2  Baseline Rate (FHR): 140 bpm  Fetal Heart Rate (FHT): 135 BPM  FHR Category:  1               Vital Signs:    Vitals:    04/01/25 0356   BP: 123/69   Pulse: 70   Resp:    Temp:    SpO2:        Notes/comments:   - fully/+2, will initiate pushing      Sully Matos MD 4/1/2025 5:51 AM

## 2025-04-01 NOTE — ANESTHESIA POSTPROCEDURE EVALUATION
Post-Op Assessment Note    CV Status:  Stable  Pain Score: 0    Pain management: adequate      Post-op block assessment: site cleaned, adhesive bandage applied and catheter intact   Mental Status:  Alert and awake   Hydration Status:  Euvolemic   PONV Controlled:  Controlled   Airway Patency:  Patent     Post Op Vitals Reviewed: Yes    No anethesia notable event occurred.    Staff: Anesthesiologist           Last Filed PACU Vitals:  Vitals Value Taken Time   Temp     Pulse     BP     Resp     SpO2

## 2025-04-02 PROBLEM — Z34.90 ENCOUNTER FOR INDUCTION OF LABOR: Status: RESOLVED | Noted: 2025-03-31 | Resolved: 2025-04-02

## 2025-04-02 LAB
ERYTHROCYTE [DISTWIDTH] IN BLOOD BY AUTOMATED COUNT: 13.1 % (ref 11.6–15.1)
HCT VFR BLD AUTO: 29.9 % (ref 34.8–46.1)
HGB BLD-MCNC: 10.2 G/DL (ref 11.5–15.4)
MCH RBC QN AUTO: 30.5 PG (ref 26.8–34.3)
MCHC RBC AUTO-ENTMCNC: 34.1 G/DL (ref 31.4–37.4)
MCV RBC AUTO: 90 FL (ref 82–98)
PLATELET # BLD AUTO: 167 THOUSANDS/UL (ref 149–390)
PMV BLD AUTO: 10.1 FL (ref 8.9–12.7)
RBC # BLD AUTO: 3.34 MILLION/UL (ref 3.81–5.12)
WBC # BLD AUTO: 10.41 THOUSAND/UL (ref 4.31–10.16)

## 2025-04-02 PROCEDURE — 85027 COMPLETE CBC AUTOMATED: CPT | Performed by: OBSTETRICS & GYNECOLOGY

## 2025-04-02 PROCEDURE — 99024 POSTOP FOLLOW-UP VISIT: CPT | Performed by: OBSTETRICS & GYNECOLOGY

## 2025-04-02 RX ORDER — FAMOTIDINE 20 MG/1
40 TABLET, FILM COATED ORAL DAILY
Status: DISCONTINUED | OUTPATIENT
Start: 2025-04-02 | End: 2025-04-03 | Stop reason: HOSPADM

## 2025-04-02 RX ADMIN — IBUPROFEN 600 MG: 600 TABLET ORAL at 06:29

## 2025-04-02 RX ADMIN — FAMOTIDINE 40 MG: 20 TABLET, FILM COATED ORAL at 09:04

## 2025-04-02 RX ADMIN — ACETAMINOPHEN 650 MG: 325 TABLET, FILM COATED ORAL at 15:34

## 2025-04-02 RX ADMIN — NIFEDIPINE 30 MG: 30 TABLET, FILM COATED, EXTENDED RELEASE ORAL at 08:19

## 2025-04-02 RX ADMIN — ACETAMINOPHEN 650 MG: 325 TABLET, FILM COATED ORAL at 19:55

## 2025-04-02 RX ADMIN — IBUPROFEN 600 MG: 600 TABLET ORAL at 12:28

## 2025-04-02 RX ADMIN — ACETAMINOPHEN 650 MG: 325 TABLET, FILM COATED ORAL at 07:36

## 2025-04-02 RX ADMIN — IBUPROFEN 600 MG: 600 TABLET ORAL at 23:30

## 2025-04-02 RX ADMIN — IBUPROFEN 600 MG: 600 TABLET ORAL at 18:08

## 2025-04-02 NOTE — PLAN OF CARE
Problem: POSTPARTUM  Goal: Experiences normal postpartum course  Description: INTERVENTIONS:- Monitor maternal vital signs- Assess uterine involution and lochia  2025 by Mona Harrington RN  Outcome: Progressing  2025 by Mona Harrington RN  Outcome: Progressing  Goal: Appropriate maternal -  bonding  Description: INTERVENTIONS:- Identify family support- Assess for appropriate maternal/infant bonding -Encourage maternal/infant bonding opportunities- Referral to  or  as needed  2025 by Mona Harrington RN  Outcome: Progressing  2025 by Mona Harrington RN  Outcome: Progressing  Goal: Establishment of infant feeding pattern  Description: INTERVENTIONS:- Assess breast/bottle feeding- Refer to lactation as needed  2025 by Mona Harrington RN  Outcome: Progressing  2025 by Mona Harrington RN  Outcome: Progressing     Problem: PAIN - ADULT  Goal: Verbalizes/displays adequate comfort level or baseline comfort level  Description: Interventions:- Encourage patient to monitor pain and request assistance- Assess pain using appropriate pain scale- Administer analgesics based on type and severity of pain and evaluate response- Implement non-pharmacological measures as appropriate and evaluate response- Consider cultural and social influences on pain and pain management- Notify physician/advanced practitioner if interventions unsuccessful or patient reports new pain  Outcome: Progressing     Problem: INFECTION - ADULT  Goal: Absence or prevention of progression during hospitalization  Description: INTERVENTIONS:- Assess and monitor for signs and symptoms of infection- Monitor lab/diagnostic results- Monitor all insertion sites, i.e. indwelling lines, tubes, and drains- Monitor endotracheal if appropriate and nasal secretions for changes in amount and color- [place] appropriate cooling/warming therapies per order- Administer medications as  ordered- Instruct and encourage patient and family to use good hand hygiene technique- Identify and instruct in appropriate isolation precautions for identified infection/condition  Outcome: Progressing  Goal: Absence of fever/infection during neutropenic period  Description: INTERVENTIONS:- Monitor WBC  Outcome: Progressing     Problem: SAFETY ADULT  Goal: Patient will remain free of falls  Description: INTERVENTIONS:- Educate patient/family on patient safety including physical limitations- Instruct patient to call for assistance with activity - Consult OT/PT to assist with strengthening/mobility - Keep Call bell within reach- Keep bed low and locked with side rails adjusted as appropriate- Keep care items and personal belongings within reach- Initiate and maintain comfort rounds- Make Fall Risk Sign visible to staff- Offer Toileting every  Hours, in advance of need- Initiate/Maintain alarm- Obtain necessary fall risk management equipment: - Apply yellow socks and bracelet for high fall risk patients- Consider moving patient to room near nurses station  Outcome: Progressing  Goal: Maintain or return to baseline ADL function  Description: INTERVENTIONS:-  Assess patient's ability to carry out ADLs; assess patient's baseline for ADL function and identify physical deficits which impact ability to perform ADLs (bathing, care of mouth/teeth, toileting, grooming, dressing, etc.)- Assess/evaluate cause of self-care deficits - Assess range of motion- Assess patient's mobility; develop plan if impaired- Assess patient's need for assistive devices and provide as appropriate- Encourage maximum independence but intervene and supervise when necessary- Involve family in performance of ADLs- Assess for home care needs following discharge - Consider OT consult to assist with ADL evaluation and planning for discharge- Provide patient education as appropriate  Outcome: Progressing  Goal: Maintains/Returns to pre admission functional  level  Description: INTERVENTIONS:- Perform AM-PAC 6 Click Basic Mobility/ Daily Activity assessment daily.- Set and communicate daily mobility goal to care team and patient/family/caregiver. - Collaborate with rehabilitation services on mobility goals if consulted- Perform Range of Motion  times a day.- Reposition patient every  hours.- Dangle patient  times a day- Stand patient  times a day- Ambulate patient  times a day- Out of bed to chair  times a day - Out of bed for meals  times a day- Out of bed for toileting- Record patient progress and toleration of activity level   Outcome: Progressing     Problem: DISCHARGE PLANNING  Goal: Discharge to home or other facility with appropriate resources  Description: INTERVENTIONS:- Identify barriers to discharge w/patient and caregiver- Arrange for needed discharge resources and transportation as appropriate- Identify discharge learning needs (meds, wound care, etc.)- Arrange for interpretive services to assist at discharge as needed- Refer to Case Management Department for coordinating discharge planning if the patient needs post-hospital services based on physician/advanced practitioner order or complex needs related to functional status, cognitive ability, or social support system  Outcome: Progressing     Problem: Knowledge Deficit  Goal: Patient/family/caregiver demonstrates understanding of disease process, treatment plan, medications, and discharge instructions  Description: Complete learning assessment and assess knowledge base.Interventions:- Provide teaching at level of understanding- Provide teaching via preferred learning methods  Outcome: Progressing

## 2025-04-02 NOTE — PROGRESS NOTES
Progress Note - OB/GYN   Name: Fady Sauerr 30 y.o. 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 Patient is doing well. Lochia WNL. She reports uterine cramping and pain in back with movement at epidural site.     Pain: yes, cramping, improved with meds  Tolerating PO: yes  Voiding: yes  Ambulating: yes  Breastfeeding:  pumping  Chest pain: no  Shortness of breath: no  Leg pain: no  Lochia: WNL    Objective :  Temp:  [97.5 °F (36.4 °C)-98.2 °F (36.8 °C)] 98 °F (36.7 °C)  HR:  [71-85] 85  BP: (110-131)/(62-85) 123/74  Resp:  [17-20] 18  SpO2:  [96 %-97 %] 97 %  O2 Device: None (Room air)    Physical Exam  Vitals and nursing note reviewed.   Constitutional:       General: She is not in acute distress.     Appearance: She is well-developed.   HENT:      Head: Normocephalic and atraumatic.   Eyes:      Conjunctiva/sclera: Conjunctivae normal.   Pulmonary:      Effort: Pulmonary effort is normal. No respiratory distress.   Abdominal:      Palpations: Abdomen is soft.      Tenderness: There is no abdominal tenderness.      Comments: Fundus firm and 1 below U. Epidural site with no erythema, ecchymosis, or drainage.    Musculoskeletal:         General: No swelling.      Cervical back: Neck supple.   Skin:     General: Skin is warm and dry.      Capillary Refill: Capillary refill takes less than 2 seconds.   Neurological:      Mental Status: She is alert.   Psychiatric:         Mood and Affect: Mood  normal.           Lab Results: I have reviewed the following results:  Lab Results   Component Value Date    WBC 10.41 (H) 04/02/2025    HGB 10.2 (L) 04/02/2025    HCT 29.9 (L) 04/02/2025    MCV 90 04/02/2025     04/02/2025

## 2025-04-02 NOTE — LACTATION NOTE
CONSULT - LACTATION  Fady Sauerr 30 y.o. female MRN: [id number]    [facility] L&D Room / Bed:  206/ 206- Encounter: [id number]    Maternal Information     MOTHER:  N/A  Maternal Age: This patient's mother is not on file.  OB History: This patient's mother is not on file.  Previous breast reduction surgery? No    Lactation history:   Has patient previously breast fed: No   How long had patient previously breast fed:     Previous breast feeding complications:     This patient's mother is not on file.    Birth information:  YOB: 1995   Time of birth:     Sex: female   Delivery type:     Birth Weight: No birth weight on file.   Percent of Weight Change: Birth weight not on file     Gestational Age: <None>   [unfilled]    Reason for Consult:    Reason for Consult: Initial assessment (routine) - 10 min, Pump Follow Up - 5 min, High Risk Infant - 10 min    Risk Factors:    Risk Factors: NICU infant    Breast and nipple assessment:   Breasts/Nipples  Date Pumping Initiated: 25  Left Breast: Soft  Right Breast: Soft  Left Nipple: Everted  Right Nipple: Everted  Intervention: Breast pump, Lanolin  Breastfeeding Status: Yes  Breastfeeding Progress: Pumping only  Reasons for not Breastfeeding: Infant medical condition    OB Lactation Tools:         Breast Pump:    Breast Pump  Pump: Manual, Electric, Personal (Spectra S1 breast pump through insurance.)  Pump Review/Education: Setup, frequency, and cleaning, Milk storage  Initiated by: Staff  Date Initiated: 25      [place] Assessment:  Baby girl in NICU.    Feeding recommendations:  pump every 2-3 hours    Fady is pumping for baby girl in NICU. Patient admits to not be following a consistent pumping schedule, with pumping only twice the day before.     Educated on purpose of pumping d/t infant separation and protecting/establishing milk supply.    Encouraged patient 8-12 milk removals in 24  "hours, ideally q2-3hrs during the day and at least 1-2 times over night not going longer than 5hrs between nighttime milk removals.    Cycled pump with patient, reviewing instructions on pumping.  Discussed duration, frequency, set up and different modes of the pump.  Reviewed supplies of pump, including assembly- placement of flanges and sizing of flanges.   Demonstrated use of hand pump.  Discussed labeling and storage of breast milk. CDC breast milk storage guidelines.  Discussed cleaning of breast pump parts.  Encouraged hands on pumping and bringing baby skin to skin prior to pumping. Putting baby's saliva on mother's nipples was reviewed.  Encouraged bedside pumping in the NICU.  Encouraged hand expression after use of the electric pump.    Discussed that colostrum is thick and sticky and comes in small amounts in the first few days. Reviewed that breast milk will start to transition day 3-5.  Educated that pumping colostrum is not a good indicator of milk supply.    Provided with education on appropriate flange size.  Educated on reputable companies that sell different flange sizes. Discussed \"[employer]\" offering different flange sizes that are compatible  with the Spectra breast pump.    Provided with NICU packet.    Encouraged to call lactation when visiting NICU after discharge, to review personal breast pump, and as baby's health status allows, to assist with latching the baby at the breast.    Patient was encouraged to contact lactation for continued support and/or questions that arise.      [place] 4/2/2025 9:00 AM  "

## 2025-04-03 VITALS
TEMPERATURE: 98.2 F | OXYGEN SATURATION: 99 % | RESPIRATION RATE: 20 BRPM | HEART RATE: 80 BPM | DIASTOLIC BLOOD PRESSURE: 91 MMHG | SYSTOLIC BLOOD PRESSURE: 127 MMHG

## 2025-04-03 PROBLEM — O99.820 GBS (GROUP B STREPTOCOCCUS CARRIER), +RV CULTURE, CURRENTLY PREGNANT: Status: RESOLVED | Noted: 2025-03-26 | Resolved: 2025-04-03

## 2025-04-03 PROBLEM — O43.123 VELAMENTOUS INSERTION OF UMBILICAL CORD IN THIRD TRIMESTER: Status: RESOLVED | Noted: 2024-11-29 | Resolved: 2025-04-03

## 2025-04-03 PROCEDURE — 99024 POSTOP FOLLOW-UP VISIT: CPT | Performed by: OBSTETRICS & GYNECOLOGY

## 2025-04-03 PROCEDURE — NC001 PR NO CHARGE: Performed by: OBSTETRICS & GYNECOLOGY

## 2025-04-03 RX ORDER — NIFEDIPINE 30 MG
30 TABLET, EXTENDED RELEASE ORAL DAILY
Qty: 30 TABLET | Refills: 0 | Status: ON HOLD | OUTPATIENT
Start: 2025-04-04 | End: 2025-04-06

## 2025-04-03 RX ORDER — DOCUSATE SODIUM 100 MG/1
100 CAPSULE, LIQUID FILLED ORAL 2 TIMES DAILY PRN
Qty: 30 CAPSULE | Refills: 0 | Status: SHIPPED | OUTPATIENT
Start: 2025-04-03

## 2025-04-03 RX ORDER — IBUPROFEN 600 MG/1
600 TABLET, FILM COATED ORAL EVERY 6 HOURS
Qty: 30 TABLET | Refills: 0 | Status: SHIPPED | OUTPATIENT
Start: 2025-04-03

## 2025-04-03 RX ADMIN — IBUPROFEN 600 MG: 600 TABLET ORAL at 07:31

## 2025-04-03 RX ADMIN — DOCUSATE SODIUM 100 MG: 100 CAPSULE, LIQUID FILLED ORAL at 09:42

## 2025-04-03 RX ADMIN — ACETAMINOPHEN 650 MG: 325 TABLET, FILM COATED ORAL at 07:31

## 2025-04-03 RX ADMIN — ACETAMINOPHEN 650 MG: 325 TABLET, FILM COATED ORAL at 12:15

## 2025-04-03 RX ADMIN — FAMOTIDINE 40 MG: 20 TABLET, FILM COATED ORAL at 09:42

## 2025-04-03 RX ADMIN — NIFEDIPINE 30 MG: 30 TABLET, FILM COATED, EXTENDED RELEASE ORAL at 09:42

## 2025-04-03 RX ADMIN — DOCUSATE SODIUM 100 MG: 100 CAPSULE, LIQUID FILLED ORAL at 17:57

## 2025-04-03 RX ADMIN — IBUPROFEN 600 MG: 600 TABLET ORAL at 17:57

## 2025-04-03 NOTE — DISCHARGE SUMMARY
Discharge Summary - OB/GYN   Name: Fady Molina 30 y.o. female I MRN: [id number]  Unit/Bed#: -01 I Date of Admission: 3/31/2025   Date of Service: 4/3/2025 I Hospital Day: 3    ADMISSION  Patient of: [facility] [place]  Admission Date: 3/31/2025   Admitting Attending: Dr. Sully Matos  Admitting Diagnoses:   Patient Active Problem List   Diagnosis    Migraine with aura    38 weeks gestation of pregnancy    Preeclampsia in postpartum period     (spontaneous vaginal delivery)       DELIVERY  Delivery Method: Vaginal, Spontaneous   Delivery Date and Time: 2025 6:42 AM  Delivery Attending: Mary Man    DISCHARGE  Discharge Date: 4/3/25  Discharge Attending: Dr. Larry  Discharge Diagnosis:   Same, Delivered    Clinical course: Admission to Delivery  Fady Molina is a 30 y.o.  who was admitted at 38w2d for IoL for GHTN.    Reason for induction:  . GHTN    She progressed to complete and began pushing.    Delivery  Route of Delivery: Vaginal, Spontaneous    Anesthesia: Epidural,   QBL: Non-Surgical QBL (mL): 672        Delivery: Vaginal, Spontaneous at 2025 6:42 AM  Laceration: Perineal:   2nd degree Repaired?  Yes    Baby's Weight: 3090 g (6 lb 13 oz); 109    Apgar scores: 8  and 6  at 1 and 5 minutes, respectively    Clinical Course: Post-Delivery:  The  was admitted to NICU for respiratory support.  Otherwise, the post delivery course was unremarkable.  She was started on Procardia XL 30mg qDay with good BP control thereafter.    On the day of discharge, the patient was ambulating, voiding spontaneously, tolerating oral intake, and hemodynamically stable. She was able to reasonably perform all ADLs. She had appropriate bowel function. Pain was well-controlled. She was discharged home on postpartum/postop day #2 without complications. Patient was instructed to follow up with her OB as an outpatient and was given appropriate warnings to call her  provider with problems or concerns.    Pertinent lab findings included:   Blood type O+.     Last three Hgb values:  Lab Results   Component Value Date    HGB 10.2 (L) 2025    HGB 13.2 2025    HGB 12.8 2025        Problem-specific follow-up plans included the following:  Assessment & Plan   (spontaneous vaginal delivery)  A/P.  30 y.o.  PPD#2 s/p delivery (Vaginal, Spontaneous) at 38w2d of 3090 g (6 lb 13 oz) female , apgars 8 /6 .  (1) PPD#2.  Delivered 2025  6:42 AM.  Doing well.  --> Discharge home.  Preeclampsia in postpartum period  BP well controlled with Procardia XL 30mg daily.  All BP normal past 24 hours.  Laboratory studies without evidence of end-organ damage.  --> Home BP monitoring, early followup.      Discharge med list:     Medication List      START taking these medications     docusate sodium 100 mg capsule; Commonly known as: COLACE; Take 1   capsule (100 mg total) by mouth 2 (two) times a day as needed for   constipation   ibuprofen 600 mg tablet; Commonly known as: MOTRIN; Take 1 tablet (600   mg total) by mouth every 6 (six) hours   NIFEdipine ER 30 MG 24 hr tablet; Commonly known as: ADALAT CC; Take 1   tablet (30 mg total) by mouth daily; Start taking on: 2025     CONTINUE taking these medications     PRENATAL + COMPLETE MULTI PO       Condition at discharge:   good     Disposition:   Home    Planned Readmission:   No    Discharge Statement:  I have spent a total time of 30 minutes in caring for this patient on the day of the visit/encounter. >30 minutes of time was spent on: Patient and family education, chart review, documenting in medical record.

## 2025-04-03 NOTE — ASSESSMENT & PLAN NOTE
BP well controlled with Procardia XL 30mg daily.  All BP normal past 24 hours.  Laboratory studies without evidence of end-organ damage.  --> Home BP monitoring, early followup.

## 2025-04-03 NOTE — PLAN OF CARE
Problem: POSTPARTUM  Goal: Experiences normal postpartum course  Description: INTERVENTIONS:- Monitor maternal vital signs- Assess uterine involution and lochia  Outcome: Progressing  Goal: Appropriate maternal -  bonding  Description: INTERVENTIONS:- Identify family support- Assess for appropriate maternal/infant bonding -Encourage maternal/infant bonding opportunities- Referral to  or  as needed  Outcome: Progressing  Goal: Establishment of infant feeding pattern  Description: INTERVENTIONS:- Assess breast/bottle feeding- Refer to lactation as needed  Outcome: Progressing     Problem: PAIN - ADULT  Goal: Verbalizes/displays adequate comfort level or baseline comfort level  Description: Interventions:- Encourage patient to monitor pain and request assistance- Assess pain using appropriate pain scale- Administer analgesics based on type and severity of pain and evaluate response- Implement non-pharmacological measures as appropriate and evaluate response- Consider cultural and social influences on pain and pain management- Notify physician/advanced practitioner if interventions unsuccessful or patient reports new pain  Outcome: Progressing     Problem: INFECTION - ADULT  Goal: Absence or prevention of progression during hospitalization  Description: INTERVENTIONS:- Assess and monitor for signs and symptoms of infection- Monitor lab/diagnostic results- Monitor all insertion sites, i.e. indwelling lines, tubes, and drains- Monitor endotracheal if appropriate and nasal secretions for changes in amount and color- [place] appropriate cooling/warming therapies per order- Administer medications as ordered- Instruct and encourage patient and family to use good hand hygiene technique- Identify and instruct in appropriate isolation precautions for identified infection/condition  Outcome: Progressing  Goal: Absence of fever/infection during neutropenic period  Description: INTERVENTIONS:-  Monitor WBC  Outcome: Progressing     Problem: SAFETY ADULT  Goal: Patient will remain free of falls  Description: INTERVENTIONS:- Educate patient/family on patient safety including physical limitations- Instruct patient to call for assistance with activity - Consult OT/PT to assist with strengthening/mobility - Keep Call bell within reach- Keep bed low and locked with side rails adjusted as appropriate- Keep care items and personal belongings within reach- Initiate and maintain comfort rounds- Make Fall Risk Sign visible to staff-  Apply yellow socks and bracelet for high fall risk patients- Consider moving patient to room near nurses station  Outcome: Progressing  Goal: Maintain or return to baseline ADL function  Description: INTERVENTIONS:-  Assess patient's ability to carry out ADLs; assess patient's baseline for ADL function and identify physical deficits which impact ability to perform ADLs (bathing, care of mouth/teeth, toileting, grooming, dressing, etc.)- Assess/evaluate cause of self-care deficits - Assess range of motion- Assess patient's mobility; develop plan if impaired- Assess patient's need for assistive devices and provide as appropriate- Encourage maximum independence but intervene and supervise when necessary- Involve family in performance of ADLs- Assess for home care needs following discharge - Consider OT consult to assist with ADL evaluation and planning for discharge- Provide patient education as appropriate  Outcome: Progressing  Goal: Maintains/Returns to pre admission functional level  Description: INTERVENTIONS:- Perform AM-PAC 6 Click Basic Mobility/ Daily Activity assessment daily.- Set and communicate daily mobility goal to care team and patient/family/caregiver. - Collaborate with rehabilitation services on mobility goals if consulted- Perform Range of Motion times a day- Out of bed for toileting- Record patient progress and toleration of activity level   Outcome: Progressing      Problem: DISCHARGE PLANNING  Goal: Discharge to home or other facility with appropriate resources  Description: INTERVENTIONS:- Identify barriers to discharge w/patient and caregiver- Arrange for needed discharge resources and transportation as appropriate- Identify discharge learning needs (meds, wound care, etc.)- Arrange for interpretive services to assist at discharge as needed- Refer to Case Management Department for coordinating discharge planning if the patient needs post-hospital services based on physician/advanced practitioner order or complex needs related to functional status, cognitive ability, or social support system  Outcome: Progressing     Problem: Knowledge Deficit  Goal: Patient/family/caregiver demonstrates understanding of disease process, treatment plan, medications, and discharge instructions  Description: Complete learning assessment and assess knowledge base.Interventions:- Provide teaching at level of understanding- Provide teaching via preferred learning methods  Outcome: Progressing

## 2025-04-03 NOTE — CASE MANAGEMENT
.        Consult(s):  NICU admission      Delivery method/date: 25   Age: Gestational age [id number]   Admitted to NICU for respiratory distress requiring HFNC     CM met w/MOB who provided the following information:      Baby's name/gender: baby girl Molina / Female     Mother of baby: Fady Molina   Father of baby//SO: Camacho Molina    Other Legal Guardian(s) for baby: none   Alternate emergency contact: none  Other children: none  Lives with: MOB, FOB   Support System: Family and Friends   Baby Supplies: all supplies at home. Have car seat and crib    Bottle or Breast Feeding: Both   Breast Pump if breast feeding: Have a pump at home   Government Assistance Programs/WIC/EBT/SSI:  None  Work/School: Work - on leave   Transportation: Both MOB and FOB have vehicles.    Prenatal care: [place]   Pediatrician:  YOLETTE Linder   Mental Health Hx or Treatment: No    Substance Abuse: No  Hx DV/IPV: No  Community Referrals/C&Y/NFP: No     Legal (parole/probation/incarceration):No  Insurance for baby: [employer]   NICU Resources and packet: Given   [facility]:  Discussed , Mob and FOB will let Cm know if they are interested      CM reviewed discharge planning process including the following: identifying caregivers at home, preference for d/c planning needs, availability of treatment team to discuss questions or concerns patient and/or family may have regarding diagnosis, plan of care, old or new medications and discharge planning . CM will continue to follow for care coordination and update assessment as appropriate.     MOB denies any other CM needs at this time. Encouraged family to contact CM as needed.     CM will follow infant in NICU through dc

## 2025-04-03 NOTE — PROGRESS NOTES
Progress Note - OB/GYN   Name: Fady Molina 30 y.o. female I MRN: [id number]  Unit/Bed#: -01 I Date of Admission: 3/31/2025   Date of Service: 4/3/2025 I Hospital Day: 3     Assessment & Plan   (spontaneous vaginal delivery)  A/P.  30 y.o.  PPD#2 s/p delivery (Vaginal, Spontaneous) at 38w2d of 3090 g (6 lb 13 oz) female , apgars 8 /6 .  (1) PPD#2.  Delivered 2025  6:42 AM.  Doing well.  --> Discharge home.  Preeclampsia in postpartum period  BP well controlled with Procardia XL 30mg daily.  All BP normal past 24 hours.  Laboratory studies without evidence of end-organ damage.  --> Home BP monitoring, early followup.    Garry Larry MD  25  ---------------------------------------------------------------------------------------------    Subjective/    Feels well.  Tolerating po, ambulating, voiding without difficulty.  Happy.  Bonding.  Mild cramps, appropriate lochia.    No HA/VF changes.    Objective/  Blood pressure 127/91, pulse 80, temperature 98.2 °F (36.8 °C), temperature source Oral, resp. rate 20, last menstrual period 2024, SpO2 99%, currently breastfeeding.    Patient Vitals for the past 24 hrs:   BP Temp Temp src Pulse Resp SpO2   25 0733 127/91 98.2 °F (36.8 °C) Oral 80 20 99 %   25 0251 128/72 97.7 °F (36.5 °C) Temporal 83 18 99 %   25 2247 130/86 97.6 °F (36.4 °C) Temporal 81 18 99 %   25 1516 133/89 98.3 °F (36.8 °C) Temporal 87 18 100 %         Physical Exam/      General:  Alert, comfortable, NAD      Cardiovascular: Regular rate and rhythm      Respiratory: Clear to auscultation bilaterally.      Abdomen: Soft, non-tender, non-distended      Fundus: Firm, below umbilicus, nontender      Extremities: Warm, non-tender      Labs/      Blood type:  O+/-      Rubella: Immune      Lab Results   Component Value Date    HGB 10.2 (L) 2025     2025    BUN 7 2025    CREATININE 0.50 (L) 2025    AST 17  03/31/2025    ALT 15 03/31/2025    UTPCR 0.4 (H) 03/31/2025

## 2025-04-03 NOTE — ASSESSMENT & PLAN NOTE
A/P.  30 y.o.  PPD#2 s/p delivery (Vaginal, Spontaneous) at 38w2d of 3090 g (6 lb 13 oz) female , apgars 8 /6 .  (1) PPD#2.  Delivered 2025  6:42 AM.  Doing well.  --> Discharge home.

## 2025-04-04 ENCOUNTER — LACTATION ENCOUNTER (OUTPATIENT)
Dept: OTHER | Facility: HOSPITAL | Age: 30
End: 2025-04-04

## 2025-04-04 ENCOUNTER — HOSPITAL ENCOUNTER (OUTPATIENT)
Facility: HOSPITAL | Age: 30
Setting detail: OBSERVATION
Discharge: HOME | End: 2025-04-06
Attending: STUDENT IN AN ORGANIZED HEALTH CARE EDUCATION/TRAINING PROGRAM | Admitting: STUDENT IN AN ORGANIZED HEALTH CARE EDUCATION/TRAINING PROGRAM
Payer: COMMERCIAL

## 2025-04-04 LAB
ALBUMIN SERPL BCG-MCNC: 3.4 G/DL (ref 3.5–5)
ALP SERPL-CCNC: 105 U/L (ref 34–104)
ALT SERPL W P-5'-P-CCNC: 17 U/L (ref 7–52)
ANION GAP SERPL CALCULATED.3IONS-SCNC: 6 MMOL/L (ref 4–13)
AST SERPL W P-5'-P-CCNC: 21 U/L (ref 13–39)
BILIRUB SERPL-MCNC: 0.41 MG/DL (ref 0.2–1)
BUN SERPL-MCNC: 8 MG/DL (ref 5–25)
CALCIUM ALBUM COR SERPL-MCNC: 8.9 MG/DL (ref 8.3–10.1)
CALCIUM SERPL-MCNC: 8.4 MG/DL (ref 8.4–10.2)
CHLORIDE SERPL-SCNC: 109 MMOL/L (ref 96–108)
CO2 SERPL-SCNC: 24 MMOL/L (ref 21–32)
CREAT SERPL-MCNC: 0.53 MG/DL (ref 0.6–1.3)
ERYTHROCYTE [DISTWIDTH] IN BLOOD BY AUTOMATED COUNT: 13 % (ref 11.6–15.1)
GFR SERPL CREATININE-BSD FRML MDRD: 126 ML/MIN/1.73SQ M
GLUCOSE SERPL-MCNC: 80 MG/DL (ref 65–140)
HCT VFR BLD AUTO: 35.5 % (ref 34.8–46.1)
HGB BLD-MCNC: 11.9 G/DL (ref 11.5–15.4)
MCH RBC QN AUTO: 30.3 PG (ref 26.8–34.3)
MCHC RBC AUTO-ENTMCNC: 33.5 G/DL (ref 31.4–37.4)
MCV RBC AUTO: 90 FL (ref 82–98)
PLATELET # BLD AUTO: 245 THOUSANDS/UL (ref 149–390)
PMV BLD AUTO: 9.5 FL (ref 8.9–12.7)
POTASSIUM SERPL-SCNC: 3.6 MMOL/L (ref 3.5–5.3)
PROT SERPL-MCNC: 6.2 G/DL (ref 6.4–8.4)
RBC # BLD AUTO: 3.93 MILLION/UL (ref 3.81–5.12)
SODIUM SERPL-SCNC: 139 MMOL/L (ref 135–147)
URATE SERPL-MCNC: 3.8 MG/DL (ref 2–7.5)
WBC # BLD AUTO: 10.19 THOUSAND/UL (ref 4.31–10.16)

## 2025-04-04 PROCEDURE — 85027 COMPLETE CBC AUTOMATED: CPT | Performed by: STUDENT IN AN ORGANIZED HEALTH CARE EDUCATION/TRAINING PROGRAM

## 2025-04-04 PROCEDURE — 80053 COMPREHEN METABOLIC PANEL: CPT | Performed by: STUDENT IN AN ORGANIZED HEALTH CARE EDUCATION/TRAINING PROGRAM

## 2025-04-04 PROCEDURE — 84550 ASSAY OF BLOOD/URIC ACID: CPT | Performed by: STUDENT IN AN ORGANIZED HEALTH CARE EDUCATION/TRAINING PROGRAM

## 2025-04-04 PROCEDURE — 99223 1ST HOSP IP/OBS HIGH 75: CPT | Performed by: STUDENT IN AN ORGANIZED HEALTH CARE EDUCATION/TRAINING PROGRAM

## 2025-04-04 RX ORDER — CALCIUM GLUCONATE 94 MG/ML
1 INJECTION, SOLUTION INTRAVENOUS ONCE AS NEEDED
Status: DISCONTINUED | OUTPATIENT
Start: 2025-04-04 | End: 2025-04-06 | Stop reason: HOSPADM

## 2025-04-04 RX ORDER — ACETAMINOPHEN 325 MG/1
650 TABLET ORAL EVERY 4 HOURS PRN
Status: DISCONTINUED | OUTPATIENT
Start: 2025-04-04 | End: 2025-04-06 | Stop reason: HOSPADM

## 2025-04-04 RX ORDER — ONDANSETRON 2 MG/ML
4 INJECTION INTRAMUSCULAR; INTRAVENOUS EVERY 8 HOURS PRN
Status: DISCONTINUED | OUTPATIENT
Start: 2025-04-04 | End: 2025-04-06 | Stop reason: HOSPADM

## 2025-04-04 RX ORDER — IBUPROFEN 600 MG/1
600 TABLET, FILM COATED ORAL EVERY 6 HOURS
Status: DISCONTINUED | OUTPATIENT
Start: 2025-04-04 | End: 2025-04-06 | Stop reason: HOSPADM

## 2025-04-04 RX ORDER — NIFEDIPINE 10 MG/1
10 CAPSULE ORAL ONCE
Status: COMPLETED | OUTPATIENT
Start: 2025-04-04 | End: 2025-04-04

## 2025-04-04 RX ORDER — NIFEDIPINE 30 MG/1
60 TABLET, EXTENDED RELEASE ORAL DAILY
Status: DISCONTINUED | OUTPATIENT
Start: 2025-04-05 | End: 2025-04-06 | Stop reason: HOSPADM

## 2025-04-04 RX ORDER — DOCUSATE SODIUM 100 MG/1
100 CAPSULE, LIQUID FILLED ORAL 2 TIMES DAILY
Status: DISCONTINUED | OUTPATIENT
Start: 2025-04-04 | End: 2025-04-06 | Stop reason: HOSPADM

## 2025-04-04 RX ORDER — NIFEDIPINE 30 MG/1
30 TABLET, EXTENDED RELEASE ORAL ONCE
Status: COMPLETED | OUTPATIENT
Start: 2025-04-04 | End: 2025-04-04

## 2025-04-04 RX ORDER — SENNOSIDES 8.6 MG
1 TABLET ORAL
Status: DISCONTINUED | OUTPATIENT
Start: 2025-04-04 | End: 2025-04-06 | Stop reason: HOSPADM

## 2025-04-04 RX ORDER — MAGNESIUM SULFATE HEPTAHYDRATE 40 MG/ML
4 INJECTION, SOLUTION INTRAVENOUS ONCE
Status: COMPLETED | OUTPATIENT
Start: 2025-04-04 | End: 2025-04-04

## 2025-04-04 RX ORDER — FAMOTIDINE 20 MG/1
20 TABLET, FILM COATED ORAL 2 TIMES DAILY
Status: DISCONTINUED | OUTPATIENT
Start: 2025-04-04 | End: 2025-04-06 | Stop reason: HOSPADM

## 2025-04-04 RX ORDER — MAGNESIUM SULFATE HEPTAHYDRATE 40 MG/ML
2 INJECTION, SOLUTION INTRAVENOUS CONTINUOUS
Status: DISCONTINUED | OUTPATIENT
Start: 2025-04-04 | End: 2025-04-06 | Stop reason: HOSPADM

## 2025-04-04 RX ADMIN — NIFEDIPINE 30 MG: 30 TABLET, FILM COATED, EXTENDED RELEASE ORAL at 21:05

## 2025-04-04 RX ADMIN — ACETAMINOPHEN 650 MG: 325 TABLET, FILM COATED ORAL at 21:11

## 2025-04-04 RX ADMIN — MAGNESIUM SULFATE HEPTAHYDRATE 2 G/HR: 40 INJECTION, SOLUTION INTRAVENOUS at 21:42

## 2025-04-04 RX ADMIN — FAMOTIDINE 20 MG: 20 TABLET, FILM COATED ORAL at 21:47

## 2025-04-04 RX ADMIN — MAGNESIUM SULFATE HEPTAHYDRATE 4 G: 40 INJECTION, SOLUTION INTRAVENOUS at 21:12

## 2025-04-04 RX ADMIN — NIFEDIPINE 10 MG: 10 CAPSULE ORAL at 20:39

## 2025-04-04 NOTE — LACTATION NOTE
This note was copied from a baby's chart.     04/04/25 0972   Lactation Consultation   Reason for Consult 20 m;20 min;15 min;10 minute   Lactation Consultant Total Time 65   Risk Factors NICU infant   Maternal Information   Has mother  before? No   Infant to breast within first hour of birth? No   Breastfeeding Delayed Due to Infant status   Exclusive Pump and Bottle Feed No   LATCH Documentation   Latch 2   Audible Swallowing 1   Type of Nipple 2   Comfort (Breast/Nipple) 2   Hold (Positioning) 1   LATCH Score 8   Having latch problems? No   Position(s) Used Cross Cradle   Breasts/Nipples   Left Breast Filling;Firm;Engorged   Right Breast Filling;Firm;Engorged   Left Nipple Everted   Right Nipple Everted   Intervention Breast pump   Breastfeeding Status Yes   Breastfeeding Progress Not yet established;Breastfeeding well   Other OB Lactation Tools   Feeding Devices Bottle;Pump   Breast Pump   Pump 1;2;3  (Spectra S1)   Pump Review/Education Setup, frequency, and cleaning;Milk storage   Initiated by Staff   Patient Follow-Up   Lactation Consult Status 2   Follow-Up Type Inpatient;Call as needed   Other OB Lactation Documentation    Additional Problem Noted f/u: met with family at bedside in NICU, latch assessment done & cycled pump with mother.     Assisted in latching baby at bedside in NICU. Baby was brought to the left breast in cross cradle position. Latching and position techniques were reviewed. After a couple of attempts baby latched and breast fed for about 10 minutes. Breast compression was encouraged to help keep baby actively sucking at the breast. Audible swallows noted.    Reviewed engorgement comfort measures with Jeriika. Discussed not going over 3 hours without stimulation- baby or breast pump.   Reviewed using pump to relieve engorgement and/or softening breast before baby latches.  Reviewed hands on pumping and encouraged warm compress as needed.  Encouraged Aarika to pump if baby is having  short feeds at the breast to protect milk supply.    Cycled patient's personal breast pump, Spectra S1 with her. Reviewed duration, modes, frequency of pumping.  Sizing of flanges was reviewed.    Patient was encouraged to contact lactation continued support and/or questions that arise.

## 2025-04-05 LAB
ALBUMIN SERPL BCG-MCNC: 3.3 G/DL (ref 3.5–5)
ALP SERPL-CCNC: 101 U/L (ref 34–104)
ALT SERPL W P-5'-P-CCNC: 16 U/L (ref 7–52)
ANION GAP SERPL CALCULATED.3IONS-SCNC: 8 MMOL/L (ref 4–13)
AST SERPL W P-5'-P-CCNC: 18 U/L (ref 13–39)
BILIRUB SERPL-MCNC: 0.34 MG/DL (ref 0.2–1)
BUN SERPL-MCNC: 8 MG/DL (ref 5–25)
CALCIUM ALBUM COR SERPL-MCNC: 7.5 MG/DL (ref 8.3–10.1)
CALCIUM SERPL-MCNC: 6.9 MG/DL (ref 8.4–10.2)
CHLORIDE SERPL-SCNC: 107 MMOL/L (ref 96–108)
CO2 SERPL-SCNC: 21 MMOL/L (ref 21–32)
CREAT SERPL-MCNC: 0.48 MG/DL (ref 0.6–1.3)
ERYTHROCYTE [DISTWIDTH] IN BLOOD BY AUTOMATED COUNT: 13 % (ref 11.6–15.1)
GFR SERPL CREATININE-BSD FRML MDRD: 130 ML/MIN/1.73SQ M
GLUCOSE SERPL-MCNC: 83 MG/DL (ref 65–140)
HCT VFR BLD AUTO: 36.3 % (ref 34.8–46.1)
HGB BLD-MCNC: 12.4 G/DL (ref 11.5–15.4)
MAGNESIUM SERPL-MCNC: 5.3 MG/DL (ref 1.9–2.7)
MCH RBC QN AUTO: 30.5 PG (ref 26.8–34.3)
MCHC RBC AUTO-ENTMCNC: 34.2 G/DL (ref 31.4–37.4)
MCV RBC AUTO: 89 FL (ref 82–98)
PLATELET # BLD AUTO: 268 THOUSANDS/UL (ref 149–390)
PMV BLD AUTO: 9.4 FL (ref 8.9–12.7)
POTASSIUM SERPL-SCNC: 3.5 MMOL/L (ref 3.5–5.3)
PROT SERPL-MCNC: 6.1 G/DL (ref 6.4–8.4)
RBC # BLD AUTO: 4.06 MILLION/UL (ref 3.81–5.12)
SODIUM SERPL-SCNC: 136 MMOL/L (ref 135–147)
WBC # BLD AUTO: 9.78 THOUSAND/UL (ref 4.31–10.16)

## 2025-04-05 PROCEDURE — 80053 COMPREHEN METABOLIC PANEL: CPT | Performed by: STUDENT IN AN ORGANIZED HEALTH CARE EDUCATION/TRAINING PROGRAM

## 2025-04-05 PROCEDURE — 99232 SBSQ HOSP IP/OBS MODERATE 35: CPT | Performed by: STUDENT IN AN ORGANIZED HEALTH CARE EDUCATION/TRAINING PROGRAM

## 2025-04-05 PROCEDURE — 85027 COMPLETE CBC AUTOMATED: CPT | Performed by: STUDENT IN AN ORGANIZED HEALTH CARE EDUCATION/TRAINING PROGRAM

## 2025-04-05 PROCEDURE — 83735 ASSAY OF MAGNESIUM: CPT | Performed by: STUDENT IN AN ORGANIZED HEALTH CARE EDUCATION/TRAINING PROGRAM

## 2025-04-05 RX ORDER — FUROSEMIDE 20 MG/1
20 TABLET ORAL DAILY
Status: DISCONTINUED | OUTPATIENT
Start: 2025-04-05 | End: 2025-04-06 | Stop reason: HOSPADM

## 2025-04-05 RX ORDER — LABETALOL 200 MG/1
200 TABLET, FILM COATED ORAL EVERY 12 HOURS SCHEDULED
Status: DISCONTINUED | OUTPATIENT
Start: 2025-04-05 | End: 2025-04-06 | Stop reason: HOSPADM

## 2025-04-05 RX ADMIN — IBUPROFEN 600 MG: 600 TABLET ORAL at 05:05

## 2025-04-05 RX ADMIN — MAGNESIUM SULFATE HEPTAHYDRATE 2 G/HR: 40 INJECTION, SOLUTION INTRAVENOUS at 07:56

## 2025-04-05 RX ADMIN — NIFEDIPINE 60 MG: 30 TABLET, FILM COATED, EXTENDED RELEASE ORAL at 08:13

## 2025-04-05 RX ADMIN — LABETALOL HYDROCHLORIDE 200 MG: 200 TABLET, FILM COATED ORAL at 22:11

## 2025-04-05 RX ADMIN — LABETALOL HYDROCHLORIDE 200 MG: 200 TABLET, FILM COATED ORAL at 11:35

## 2025-04-05 RX ADMIN — ACETAMINOPHEN 650 MG: 325 TABLET, FILM COATED ORAL at 09:55

## 2025-04-05 RX ADMIN — IBUPROFEN 600 MG: 600 TABLET ORAL at 11:35

## 2025-04-05 RX ADMIN — DOCUSATE SODIUM 100 MG: 100 CAPSULE, LIQUID FILLED ORAL at 08:14

## 2025-04-05 RX ADMIN — FAMOTIDINE 20 MG: 20 TABLET, FILM COATED ORAL at 22:11

## 2025-04-05 RX ADMIN — MAGNESIUM SULFATE HEPTAHYDRATE 2 G/HR: 40 INJECTION, SOLUTION INTRAVENOUS at 18:14

## 2025-04-05 RX ADMIN — FAMOTIDINE 20 MG: 20 TABLET, FILM COATED ORAL at 08:13

## 2025-04-05 RX ADMIN — DOCUSATE SODIUM 100 MG: 100 CAPSULE, LIQUID FILLED ORAL at 18:08

## 2025-04-05 RX ADMIN — IBUPROFEN 600 MG: 600 TABLET ORAL at 18:08

## 2025-04-05 RX ADMIN — FUROSEMIDE 20 MG: 20 TABLET ORAL at 09:55

## 2025-04-05 NOTE — PROGRESS NOTES
Progress Note - OB/GYN   Name: Fady Sauerr 30 y.o. female I MRN: [id number]  Unit/Bed#: -01 I Date of Admission: 2025   Date of Service: 2025 I Hospital Day: 0    Assessment & Plan  Severe pre-eclampsia, postpartum  - Clinically stable. Blood pressure adequately controlled on Procardia XL 60 mg PO daily.   - Continue magnesium sulfate for seizure prophylaxis.   - Will add Lasix 20 mg PO daily x 5 days to facilitate diuresis.   - Continue vitals q4h while awake. Plan to titrate antihypertensives today based on trend.    (spontaneous vaginal delivery)  - Routine postpartum care   - Breast pump to bedside   - Tylenol/Motrin PRN    OB L&D Progress Note  Subjective   Mild headache. Denies changes in vision, chest pain, SOB, RUQ pain. Peripheral swelling improving.     Objective :  Temp:  [97.8 °F (36.6 °C)-98.2 °F (36.8 °C)] 97.8 °F (36.6 °C)  HR:  [] 94  BP: (132-166)/() 136/89  Resp:  [16-18] 18  SpO2:  [96 %-100 %] 100 %  O2 Device: None (Room air)    Physical Exam  Vitals reviewed.   Constitutional:       General: She is not in acute distress.     Appearance: Normal appearance. She is not ill-appearing.   Cardiovascular:      Rate and Rhythm: Normal rate.   Pulmonary:      Effort: Pulmonary effort is normal.   Abdominal:      General: There is no distension.      Palpations: Abdomen is soft.      Tenderness: There is no abdominal tenderness. There is no guarding.   Musculoskeletal:         General: Normal range of motion.      Cervical back: Normal range of motion.   Skin:     General: Skin is warm and dry.   Neurological:      General: No focal deficit present.      Mental Status: She is alert and oriented to person, place, and time. Mental status is at baseline.      Deep Tendon Reflexes: Reflexes normal.   Psychiatric:         Mood and Affect: Mood normal.         Behavior: Behavior normal.         Thought Content: Thought content normal.         Judgment: Judgment normal.            Lab Results: I have reviewed the following results:CBC/BMP:   .     04/05/25  0601   WBC 9.78   HGB 12.4   HCT 36.3      SODIUM 136   K 3.5      CO2 21   BUN 8   CREATININE 0.48*   GLUC 83   MG 5.3*    , Creatinine Clearance: Estimated Creatinine Clearance: 172.4 mL/min (A) (by C-G formula based on SCr of 0.48 mg/dL (L))., LFTs:   .     04/05/25  0601   AST 18   ALT 16   ALB 3.3*   TBILI 0.34   ALKPHOS 101

## 2025-04-05 NOTE — ASSESSMENT & PLAN NOTE
- Patient met criteria for pre-eclampsia while in labor/postpartum period (P/C 0.40)   - Did not meet criteria for severe features, magnesium sulfate was deferred  - She was discharged home on Procardia 30 mg XL (reports compliance with medication)   - Now with severe range BP + HA. Initial /104. Exam benign, 1+ patellar reflexes, no pitting edema   - IR Procardia 10 mg given; Will increase to 60 mg XL daily for improved BP control   - CBC, CMP ordered   - Will initiate magnesium sulfate therapy for seizure prophylaxis   - Continue to monitor BP and re-assess HA after trial of Tylenol   - Plan to repeat labs in the AM

## 2025-04-05 NOTE — ASSESSMENT & PLAN NOTE
- Clinically stable. Blood pressure adequately controlled on Procardia XL 60 mg PO daily.   - Continue magnesium sulfate for seizure prophylaxis.   - Will add Lasix 20 mg PO daily x 5 days to facilitate diuresis.   - Continue vitals q4h while awake. Plan to titrate antihypertensives today based on trend.

## 2025-04-05 NOTE — H&P
"H&P - OB/GYN   Name: Fady Molina 30 y.o. female I MRN: [id number]  Unit/Bed#: -01 I Date of Admission: 2025   Date of Service: 2025 I Hospital Day: 0     Assessment & Plan  Severe pre-eclampsia, postpartum  - Patient met criteria for pre-eclampsia while in labor/postpartum period (P/C 0.40)   - Did not meet criteria for severe features, magnesium sulfate was deferred  - She was discharged home on Procardia 30 mg XL (reports compliance with medication)   - Now with severe range BP + HA. Initial /104. Exam benign, 1+ patellar reflexes, no pitting edema   - IR Procardia 10 mg given; Will increase to 60 mg XL daily for improved BP control   - CBC, CMP ordered   - Will initiate magnesium sulfate therapy for seizure prophylaxis   - Continue to monitor BP and re-assess HA after trial of Tylenol   - Plan to repeat labs in the AM   (spontaneous vaginal delivery)  - Routine postpartum care   - Breast pump to bedside   - Tylenol/Motrin PRN    History of Present Illness   Patient of: [facility]    Brief Summary: Fady Molina  s/p  on  re-presents with complaint of elevated BP and frontal HA.     Chief Complaint: elevated BP + frontal HA     HPI:    Patient reports doing well overall. She reported today a frontal HA developed, unrelieved by Motrin. Had not tried Tylenol. She denied visual changes. She also reported chest discomfort, now resolved. She has been breastfeeding and her breasts currently are \"full\" and she would like to pump. She otherwise denies SOB, abdominal pains. Her vaginal bleeding is minimal. ROS otherwise unremarkable.     Historical Information   Medical History Review: I have reviewed the patient's PMH, PSH, Social History, Family History, Meds, and Allergies   OB History    Para Term  AB Living   1 1 1 0 0 1   SAB IAB Ectopic Multiple Live Births   0 0 0 0 1      # Outcome Date GA Lbr Joaquin/2nd Weight Sex Type Anes PTL Lv "   1 Term 25 38w2d / 00:57 3090 g (6 lb 13 oz) F Vag-Spont EPI N OLGA     Objective :  BP: (164)/(102) 164/102    Physical Exam  Vitals and nursing note reviewed.   Constitutional:       General: She is not in acute distress.     Appearance: She is well-developed.   HENT:      Head: Normocephalic and atraumatic.   Eyes:      Conjunctiva/sclera: Conjunctivae normal.   Cardiovascular:      Rate and Rhythm: Normal rate and regular rhythm.      Heart sounds: No murmur heard.  Pulmonary:      Effort: Pulmonary effort is normal. No respiratory distress.      Breath sounds: Normal breath sounds.   Abdominal:      Palpations: Abdomen is soft.      Tenderness: There is no abdominal tenderness.   Musculoskeletal:         General: No swelling.      Cervical back: Neck supple.   Skin:     General: Skin is warm and dry.      Capillary Refill: Capillary refill takes less than 2 seconds.   Neurological:      General: No focal deficit present.      Mental Status: She is alert and oriented to person, place, and time.   Psychiatric:         Mood and Affect: Mood normal.            Lab Results: I have reviewed the following results:  Strep Grp B EDGAR   Date Value Ref Range Status   2025 Positive (A) Negative Final     Comment:     Centers for Disease Control and Prevention (CDC) and American Congress  of Obstetricians and Gynecologists (ACOG) guidelines for prevention of   group B streptococcal (GBS) disease specify co-collection of  a vaginal and rectal swab specimen to maximize sensitivity of GBS  detection. Per the CDC and ACOG, swabbing both the lower vagina and  rectum substantially increases the yield of detection compared with  sampling the vagina alone.  Penicillin G, ampicillin, or cefazolin are indicated for intrapartum  prophylaxis of  GBS colonization. Reflex susceptibility  testing should be performed prior to use of clindamycin only on GBS  isolates from penicillin-allergic women who are  "considered a high risk  for anaphylaxis. Treatment with vancomycin without additional testing  is warranted if resistance to clindamycin is noted.       No results found for: \"STREPGPB\"  RPR   Date Value Ref Range Status   01/20/2025 Non Reactive Non Reactive Final     Hepatitis B Surface Ag   Date Value Ref Range Status   09/27/2024 neg  Final     No components found for: \"EXTHEPBSAG\"  HEP C AB   Date Value Ref Range Status   09/27/2024 Non Reactive Non Reactive Final     No results found for: \"RUBELLAIGGQT\"  External Rubella IGG Quantitation   Date Value Ref Range Status   09/27/2024 immune  Final     HIV-1/HIV-2 AB   Date Value Ref Range Status   09/27/2024 Non-Reactive  Final     No components found for: \"GSXYOV1UO\"  No results found for: \"LABNGO\", \"LABCHLA\"    Type & Screen:  ABO Grouping   Date Value Ref Range Status   03/31/2025 O  Final     Rh Factor   Date Value Ref Range Status   03/31/2025 Positive  Final     Rh Type   Date Value Ref Range Status   09/27/2024 Positive  Final     Comment:     Please note: Prior records for this patient's ABO / Rh type are not  available for additional verification.       No results found for: \"ANTIBODYSCR\"  Specimen Expiration Date   Date Value Ref Range Status   03/31/2025 [id number]  Final     Administrative Statements   I have spent a total time of 30 minutes in caring for this patient on the day of the visit/encounter including Diagnostic results, Prognosis, Risks and benefits of tx options, Patient and family education, Risk factor reductions, Counseling / Coordination of care, Documenting in the medical record, Reviewing/placing orders in the medical record (including tests, medications, and/or procedures), and Obtaining or reviewing history  .    Gregory Soriano MD  "

## 2025-04-05 NOTE — PLAN OF CARE
Problem: PAIN - ADULT  Goal: Verbalizes/displays adequate comfort level or baseline comfort level  Description: Interventions:- Encourage patient to monitor pain and request assistance- Assess pain using appropriate pain scale- Administer analgesics based on type and severity of pain and evaluate response- Implement non-pharmacological measures as appropriate and evaluate response- Consider cultural and social influences on pain and pain management- Notify physician/advanced practitioner if interventions unsuccessful or patient reports new pain  Outcome: Progressing     Problem: INFECTION - ADULT  Goal: Absence or prevention of progression during hospitalization  Description: INTERVENTIONS:- Assess and monitor for signs and symptoms of infection- Monitor lab/diagnostic results- Monitor all insertion sites, i.e. indwelling lines, tubes, and drains- Monitor endotracheal if appropriate and nasal secretions for changes in amount and color- [place] appropriate cooling/warming therapies per order- Administer medications as ordered- Instruct and encourage patient and family to use good hand hygiene technique- Identify and instruct in appropriate isolation precautions for identified infection/condition  Outcome: Progressing  Goal: Absence of fever/infection during neutropenic period  Description: INTERVENTIONS:- Monitor WBC  Outcome: Progressing     Problem: SAFETY ADULT  Goal: Patient will remain free of falls  Description: INTERVENTIONS:- Educate patient/family on patient safety including physical limitations- Instruct patient to call for assistance with activity - Consult OT/PT to assist with strengthening/mobility - Keep Call bell within reach- Keep bed low and locked with side rails adjusted as appropriate- Keep care items and personal belongings within reach- Initiate and maintain comfort rounds- Make Fall Risk Sign visible to staff  Outcome: Progressing  Goal: Maintain or return to baseline ADL  function  Description: INTERVENTIONS:-  Assess patient's ability to carry out ADLs; assess patient's baseline for ADL function and identify physical deficits which impact ability to perform ADLs (bathing, care of mouth/teeth, toileting, grooming, dressing, etc.)- Assess/evaluate cause of self-care deficits - Assess range of motion- Assess patient's mobility; develop plan if impaired- Assess patient's need for assistive devices and provide as appropriate- Encourage maximum independence but intervene and supervise when necessary- Involve family in performance of ADLs- Assess for home care needs following discharge - Consider OT consult to assist with ADL evaluation and planning for discharge- Provide patient education as appropriate  Outcome: Progressing  Goal: Maintains/Returns to pre admission functional level  Description: INTERVENTIONS:- Perform AM-PAC 6 Click Basic Mobility/ Daily Activity assessment daily.- Set and communicate daily mobility goal to care team and patient/family/caregiver. - Collaborate with rehabilitation services on mobility goals if consulted-  Outcome: Progressing     Problem: Knowledge Deficit  Goal: Patient/family/caregiver demonstrates understanding of disease process, treatment plan, medications, and discharge instructions  Description: Complete learning assessment and assess knowledge base.Interventions:- Provide teaching at level of understanding- Provide teaching via preferred learning methods  Outcome: Progressing     Problem: DISCHARGE PLANNING  Goal: Discharge to home or other facility with appropriate resources  Description: INTERVENTIONS:- Identify barriers to discharge w/patient and caregiver- Arrange for needed discharge resources and transportation as appropriate- Identify discharge learning needs (meds, wound care, etc.)- Arrange for interpretive services to assist at discharge as needed- Refer to Case Management Department for coordinating discharge planning if the patient  needs post-hospital services based on physician/advanced practitioner order or complex needs related to functional status, cognitive ability, or social support system  Outcome: Progressing

## 2025-04-05 NOTE — H&P
H&P - OB/GYN   Name: Fady Molina 30 y.o. female I MRN: [id number]  Unit/Bed#: -01 I Date of Admission: 2025   Date of Service: 2025 I Hospital Day: 0   { ?Quick Links I Problem List I PORCH I Billing Tip:76457}  Assessment & Plan  Postpartum hypertension       PP day #3 with severe range BP as noted in NICU       History of Present Illness   Patient of: {OBGYN Practices:41578}  Brief Summary: Fady Molina  with an JADA of 2025, by Ultrasound at 38w2d presenting with headache and severe range BP while in the NICU as was boarding in the NICU with her baby.  NICU nurse got a high BP reading and alerted charge nurse.      Chief Complaint: elevated Bps with Headache Headache  and elevated BP.    HPI:  Pregnancy complications: {SL IP OB PREGNANCY COMPLICATIONS:38509}.     Review of Systems    Historical Information   {Select to insert medical history sections:06802}  OB History    Para Term  AB Living   1 1 1 0 0 1   SAB IAB Ectopic Multiple Live Births   0 0 0 0 1      # Outcome Date GA Lbr Joaquin/2nd Weight Sex Type Anes PTL Lv   1 Term 25 38w2d / 00:57 3090 g (6 lb 13 oz) F Vag-Spont EPI N OLGA     Baby complications/comments: ***    Objective :{?Quick Links I Delivery Summary I Vitals I I/Os I LDAs I Active Meds I Pain Meds I Antibiotics :16865}       Physical Exam   ***  Cervical Exam     Contractions:     Fetal heart rate     {?Quick Links I Lab Review I Micro Results I Radiology I Cardiology:96739}  Lab Results: I have reviewed the following results:  Strep Grp B EDGAR   Date Value Ref Range Status   2025 Positive (A) Negative Final     Comment:     Centers for Disease Control and Prevention (CDC) and American Congress  of Obstetricians and Gynecologists (ACOG) guidelines for prevention of   group B streptococcal (GBS) disease specify co-collection of  a vaginal and rectal swab specimen to maximize sensitivity of GBS  detection. Per the CDC and  "ACOG, swabbing both the lower vagina and  rectum substantially increases the yield of detection compared with  sampling the vagina alone.  Penicillin G, ampicillin, or cefazolin are indicated for intrapartum  prophylaxis of  GBS colonization. Reflex susceptibility  testing should be performed prior to use of clindamycin only on GBS  isolates from penicillin-allergic women who are considered a high risk  for anaphylaxis. Treatment with vancomycin without additional testing  is warranted if resistance to clindamycin is noted.       No results found for: \"STREPGPB\"  RPR   Date Value Ref Range Status   2025 Non Reactive Non Reactive Final     Hepatitis B Surface Ag   Date Value Ref Range Status   2024 neg  Final     No components found for: \"EXTHEPBSAG\"  HEP C AB   Date Value Ref Range Status   2024 Non Reactive Non Reactive Final     No results found for: \"RUBELLAIGGQT\"  External Rubella IGG Quantitation   Date Value Ref Range Status   2024 immune  Final     HIV-1/HIV-2 AB   Date Value Ref Range Status   2024 Non-Reactive  Final     No components found for: \"OUZWWQ1EA\"  No results found for: \"LABNGO\", \"LABCHLA\"    Type & Screen:  ABO Grouping   Date Value Ref Range Status   2025 O  Final     Rh Factor   Date Value Ref Range Status   2025 Positive  Final     Rh Type   Date Value Ref Range Status   2024 Positive  Final     Comment:     Please note: Prior records for this patient's ABO / Rh type are not  available for additional verification.       No results found for: \"ANTIBODYSCR\"  Specimen Expiration Date   Date Value Ref Range Status   [id number]  Final       {Administrative / Billing Section (Optional):96283}  "

## 2025-04-06 VITALS
OXYGEN SATURATION: 99 % | SYSTOLIC BLOOD PRESSURE: 132 MMHG | HEART RATE: 75 BPM | BODY MASS INDEX: 28.12 KG/M2 | DIASTOLIC BLOOD PRESSURE: 83 MMHG | TEMPERATURE: 98.4 F | HEIGHT: 65 IN | RESPIRATION RATE: 16 BRPM

## 2025-04-06 PROCEDURE — 99232 SBSQ HOSP IP/OBS MODERATE 35: CPT | Performed by: OBSTETRICS & GYNECOLOGY

## 2025-04-06 PROCEDURE — G0379 DIRECT REFER HOSPITAL OBSERV: HCPCS

## 2025-04-06 RX ORDER — NIFEDIPINE 30 MG
60 TABLET, EXTENDED RELEASE ORAL DAILY
Start: 2025-04-06 | End: 2025-04-13 | Stop reason: SDUPTHER

## 2025-04-06 RX ORDER — ACETAMINOPHEN 325 MG/1
650 TABLET ORAL EVERY 6 HOURS PRN
Start: 2025-04-06

## 2025-04-06 RX ORDER — LABETALOL 200 MG/1
200 TABLET, FILM COATED ORAL EVERY 12 HOURS SCHEDULED
Qty: 60 TABLET | Refills: 0 | Status: SHIPPED | OUTPATIENT
Start: 2025-04-06

## 2025-04-06 RX ORDER — FUROSEMIDE 20 MG/1
20 TABLET ORAL DAILY
Qty: 3 TABLET | Refills: 0 | Status: SHIPPED | OUTPATIENT
Start: 2025-04-07 | End: 2025-04-10

## 2025-04-06 RX ADMIN — IBUPROFEN 600 MG: 600 TABLET ORAL at 10:18

## 2025-04-06 RX ADMIN — NIFEDIPINE 60 MG: 30 TABLET, FILM COATED, EXTENDED RELEASE ORAL at 08:02

## 2025-04-06 RX ADMIN — DOCUSATE SODIUM 100 MG: 100 CAPSULE, LIQUID FILLED ORAL at 08:02

## 2025-04-06 RX ADMIN — LABETALOL HYDROCHLORIDE 200 MG: 200 TABLET, FILM COATED ORAL at 08:02

## 2025-04-06 RX ADMIN — FUROSEMIDE 20 MG: 20 TABLET ORAL at 08:02

## 2025-04-06 RX ADMIN — FAMOTIDINE 20 MG: 20 TABLET, FILM COATED ORAL at 08:02

## 2025-04-06 NOTE — PLAN OF CARE
Problem: PAIN - ADULT  Goal: Verbalizes/displays adequate comfort level or baseline comfort level  Description: Interventions:- Encourage patient to monitor pain and request assistance- Assess pain using appropriate pain scale- Administer analgesics based on type and severity of pain and evaluate response- Implement non-pharmacological measures as appropriate and evaluate response- Consider cultural and social influences on pain and pain management- Notify physician/advanced practitioner if interventions unsuccessful or patient reports new pain  Outcome: Progressing     Problem: INFECTION - ADULT  Goal: Absence or prevention of progression during hospitalization  Description: INTERVENTIONS:- Assess and monitor for signs and symptoms of infection- Monitor lab/diagnostic results- Monitor all insertion sites, i.e. indwelling lines, tubes, and drains- Monitor endotracheal if appropriate and nasal secretions for changes in amount and color- [place] appropriate cooling/warming therapies per order- Administer medications as ordered- Instruct and encourage patient and family to use good hand hygiene technique- Identify and instruct in appropriate isolation precautions for identified infection/condition  Outcome: Progressing  Goal: Absence of fever/infection during neutropenic period  Description: INTERVENTIONS:- Monitor WBC  Outcome: Progressing     Problem: SAFETY ADULT  Goal: Patient will remain free of falls  Description: INTERVENTIONS:- Educate patient/family on patient safety including physical limitations- Instruct patient to call for assistance with activity - Consult OT/PT to assist with strengthening/mobility - Keep Call bell within reach- Keep bed low and locked with side rails adjusted as appropriate- Keep care items and personal belongings within reach- Initiate and maintain comfort rounds- Make Fall Risk Sign visible to staff  Outcome: Progressing  Goal: Maintain or return to baseline ADL  function  Description: INTERVENTIONS:-  Assess patient's ability to carry out ADLs; assess patient's baseline for ADL function and identify physical deficits which impact ability to perform ADLs (bathing, care of mouth/teeth, toileting, grooming, dressing, etc.)- Assess/evaluate cause of self-care deficits - Assess range of motion- Assess patient's mobility; develop plan if impaired- Assess patient's need for assistive devices and provide as appropriate- Encourage maximum independence but intervene and supervise when necessary- Involve family in performance of ADLs- Assess for home care needs following discharge - Consider OT consult to assist with ADL evaluation and planning for discharge- Provide patient education as appropriate  Outcome: Progressing  Goal: Maintains/Returns to pre admission functional level  Description: INTERVENTIONS:- Perform AM-PAC 6 Click Basic Mobility/ Daily Activity assessment daily.- Set and communicate daily mobility goal to care team and patient/family/caregiver. - Collaborate with rehabilitation services on mobility goals if consulted  Outcome: Progressing     Problem: Knowledge Deficit  Goal: Patient/family/caregiver demonstrates understanding of disease process, treatment plan, medications, and discharge instructions  Description: Complete learning assessment and assess knowledge base.Interventions:- Provide teaching at level of understanding- Provide teaching via preferred learning methods  Outcome: Progressing     Problem: DISCHARGE PLANNING  Goal: Discharge to home or other facility with appropriate resources  Description: INTERVENTIONS:- Identify barriers to discharge w/patient and caregiver- Arrange for needed discharge resources and transportation as appropriate- Identify discharge learning needs (meds, wound care, etc.)- Arrange for interpretive services to assist at discharge as needed- Refer to Case Management Department for coordinating discharge planning if the patient  needs post-hospital services based on physician/advanced practitioner order or complex needs related to functional status, cognitive ability, or social support system  Outcome: Progressing

## 2025-04-06 NOTE — ASSESSMENT & PLAN NOTE
- Clinically stable, s/p magnesium sulfate.  - Labetalol added yesterday for blood pressure control and currently good control on following medication    - Procardia XL 60mg qd    - Labetalol 200mg bid    - Lasix 20mg PO to help diuresis - now day 2 of 5 days course  - Patient is asymptomatic.  Will monitor BP and d/c home later today if continue to be stabl  - Preeclampsia precautions reviewed, check BP at home, return to office Thursday or Friday for BP check  - ordered Labetalol and lasix to pharmacy.  Already picked up Procardia XL 30mg last week - will just increase to 2 pills for 60mg daily.

## 2025-04-06 NOTE — DISCHARGE INSTR - AVS FIRST PAGE
HIGH BLOOD PRESSURES IN AND AROUND PREGNANCY    WHAT YOU NEED TO KNOW:   Hypertension is high blood pressure (BP). Hypertension during pregnancy is a BP of 140/90 or higher. Severe hypertension is at least 160/110. One or both numbers of these readings may be high. Hypertension may start before you become pregnant, or develop during pregnancy. Pregnancy can cause high BP, or it may develop because of other risk factors you had before you became pregnant. It is important to get screened and treated for an elevated BP or hypertension during pregnancy. This can prevent problems for you and your baby.    Some people with high blood pressure in pregnancy can develop Preeclampsia, which is a condition specific to pregnancy.  Preeclampsia is high blood pressure (BP) that usually develops after week 20 of pregnancy. It can also develop days to weeks after delivery, even if you did not have high BP during pregnancy. When it develops after delivery, it may also be called postpartum preeclampsia. Preeclampsia causes your BP to be 140/90 or higher. You may also have protein in your urine or damage to organs such as your kidneys or liver. This is diagnosed with urine and blood testing. It can also lead to eclampsia, a condition that causes seizures from high BP.    You have been diagnosed with pre-eclampsia with severe features.    When should I call my doctor?   You develop a severe headache that does not go away with Tylenol/Motrin or rest.     You have new or increased vision changes, such as blurred or spotted vision.     You have severe abdominal pain with or without nausea and vomiting.     You have shortness of breath or chest pain.     If you were told to check your blood pressure at home - call your doctor if your blood pressure is higher than 160/105.    To check blood pressure at home - Sit and rest for 5 minutes before you take your BP. Extend your arm and support it on a flat surface. Your arm should be at the same  level as your heart. Follow the directions that came with your BP monitor. Take your BP as often as directed. Keep a record of your BP readings and bring it to your follow-up visits.    You have questions or concerns about your condition or care.

## 2025-04-06 NOTE — ASSESSMENT & PLAN NOTE
- PPD#5 s/p , with 2nd degree laceration  - doing well  - discharge home today if BP continues to be stable

## 2025-04-06 NOTE — PLAN OF CARE
Problem: PAIN - ADULT  Goal: Verbalizes/displays adequate comfort level or baseline comfort level  Description: Interventions:- Encourage patient to monitor pain and request assistance- Assess pain using appropriate pain scale- Administer analgesics based on type and severity of pain and evaluate response- Implement non-pharmacological measures as appropriate and evaluate response- Consider cultural and social influences on pain and pain management- Notify physician/advanced practitioner if interventions unsuccessful or patient reports new pain  Outcome: Progressing     Problem: INFECTION - ADULT  Goal: Absence or prevention of progression during hospitalization  Description: INTERVENTIONS:- Assess and monitor for signs and symptoms of infection- Monitor lab/diagnostic results- Monitor all insertion sites, i.e. indwelling lines, tubes, and drains- Monitor endotracheal if appropriate and nasal secretions for changes in amount and color- [place] appropriate cooling/warming therapies per order- Administer medications as ordered- Instruct and encourage patient and family to use good hand hygiene technique- Identify and instruct in appropriate isolation precautions for identified infection/condition  Outcome: Progressing  Goal: Absence of fever/infection during neutropenic period  Description: INTERVENTIONS:- Monitor WBC  Outcome: Progressing     Problem: SAFETY ADULT  Goal: Patient will remain free of falls  Description: INTERVENTIONS:- Educate patient/family on patient safety including physical limitations- Instruct patient to call for assistance with activity - Consult OT/PT to assist with strengthening/mobility - Keep Call bell within reach- Keep bed low and locked with side rails adjusted as appropriate- Keep care items and personal belongings within reach- Initiate and maintain comfort rounds- Make Fall Risk Sign visible to staff- Offer Toileting every  Hours, in advance of need- Initiate/Maintain alarm- Obtain  necessary fall risk management equipment: - Apply yellow socks and bracelet for high fall risk patients- Consider moving patient to room near nurses station  Outcome: Progressing  Goal: Maintain or return to baseline ADL function  Description: INTERVENTIONS:-  Assess patient's ability to carry out ADLs; assess patient's baseline for ADL function and identify physical deficits which impact ability to perform ADLs (bathing, care of mouth/teeth, toileting, grooming, dressing, etc.)- Assess/evaluate cause of self-care deficits - Assess range of motion- Assess patient's mobility; develop plan if impaired- Assess patient's need for assistive devices and provide as appropriate- Encourage maximum independence but intervene and supervise when necessary- Involve family in performance of ADLs- Assess for home care needs following discharge - Consider OT consult to assist with ADL evaluation and planning for discharge- Provide patient education as appropriate  Outcome: Progressing  Goal: Maintains/Returns to pre admission functional level  Description: INTERVENTIONS:- Perform AM-PAC 6 Click Basic Mobility/ Daily Activity assessment daily.- Set and communicate daily mobility goal to care team and patient/family/caregiver. - Collaborate with rehabilitation services on mobility goals if consulted- Perform Range of Motion  times a day.- Reposition patient every  hours.- Dangle patient  times a day- Stand patient  times a day- Ambulate patient  times a day- Out of bed to chair  times a day - Out of bed for meals  times a day- Out of bed for toileting- Record patient progress and toleration of activity level   Outcome: Progressing     Problem: Knowledge Deficit  Goal: Patient/family/caregiver demonstrates understanding of disease process, treatment plan, medications, and discharge instructions  Description: Complete learning assessment and assess knowledge base.Interventions:- Provide teaching at level of understanding- Provide  teaching via preferred learning methods  Outcome: Progressing     Problem: DISCHARGE PLANNING  Goal: Discharge to home or other facility with appropriate resources  Description: INTERVENTIONS:- Identify barriers to discharge w/patient and caregiver- Arrange for needed discharge resources and transportation as appropriate- Identify discharge learning needs (meds, wound care, etc.)- Arrange for interpretive services to assist at discharge as needed- Refer to Case Management Department for coordinating discharge planning if the patient needs post-hospital services based on physician/advanced practitioner order or complex needs related to functional status, cognitive ability, or social support system  Outcome: Progressing

## 2025-04-06 NOTE — PROGRESS NOTES
Progress Note - OB/GYN   Name: Fady Sauerr 30 y.o. female I MRN: [id number]  Unit/Bed#: -01 I Date of Admission: 2025   Date of Service: 2025 I Hospital Day: 0     Assessment & Plan  Severe pre-eclampsia, postpartum  - Clinically stable, s/p magnesium sulfate.  - Labetalol added yesterday for blood pressure control and currently good control on following medication    - Procardia XL 60mg qd    - Labetalol 200mg bid    - Lasix 20mg PO to help diuresis - now day 2 of 5 days course  - Patient is asymptomatic.  Will monitor BP and d/c home later today if continue to be stabl  - Preeclampsia precautions reviewed, check BP at home, return to office Thursday or Friday for BP check  - ordered Labetalol and lasix to pharmacy.  Already picked up Procardia XL 30mg last week - will just increase to 2 pills for 60mg daily.        (spontaneous vaginal delivery)  - PPD#5 s/p , with 2nd degree laceration  - doing well  - discharge home today if BP continues to be stable    OB L&D Progress Note  Subjective   Denies HA, vision changes, N/V, CP, SOB, abdominal pain.    + Flatus, lochia decreased.  Feeling much better since magnesium stopped.    Objective :  Temp:  [97.8 °F (36.6 °C)-98.4 °F (36.9 °C)] 98.4 °F (36.9 °C)  HR:  [77-87] 80  BP: (116-153)/() 131/82  Resp:  [16-18] 16  SpO2:  [98 %-99 %] 99 %  O2 Device: None (Room air)    Physical Exam  General: in no apparent distress  Abdomen: abdomen is soft without significant tenderness  Fundus: Firm and non-tender, 3 below the umbilicus  Perineum: exam deferred  Lower extremities: nontender      Lab Results: I have reviewed the following results:CBC/BMP: No new results in last 24 hours. , Creatinine Clearance: Estimated Creatinine Clearance: 172.4 mL/min (A) (by C-G formula based on SCr of 0.48 mg/dL (L)).

## 2025-04-10 ENCOUNTER — POSTPARTUM VISIT (OUTPATIENT)
Dept: OBGYN CLINIC | Facility: CLINIC | Age: 30
End: 2025-04-10

## 2025-04-10 VITALS
SYSTOLIC BLOOD PRESSURE: 102 MMHG | HEIGHT: 65 IN | BODY MASS INDEX: 24.83 KG/M2 | DIASTOLIC BLOOD PRESSURE: 70 MMHG | WEIGHT: 149 LBS

## 2025-04-10 PROCEDURE — 99024 POSTOP FOLLOW-UP VISIT: CPT | Performed by: STUDENT IN AN ORGANIZED HEALTH CARE EDUCATION/TRAINING PROGRAM

## 2025-04-10 NOTE — PROGRESS NOTES
"Name: Fady Molina      : 1995      MRN: [id number]  Encounter Provider: Simon Whalen MD  Encounter Date: 4/10/2025   Encounter department: [facility] [place]  :  Assessment & Plan  Severe pre-eclampsia, postpartum  - Blood pressure normal today. No signs/symptoms of worsening pre-eclampsia.   - Continue Procardia XL 60 mg PO daily and labetalol 200 mg PO BID.   - Continue home blood pressure monitoring. Parameters reviewed.        Postpartum care following vaginal delivery  - Meeting all postpartum milestones.  - Return to office for routine postpartum follow up in 2 weeks.            History of Present Illness   HPI  Fady Molina is a 30 y.o. female who presents for postpartum blood pressure follow up. She is 9 days postpartum following a vaginal delivery on 2025 at 38w2d. Her postpartum course was complicated by pre-eclampsia with severe features requiring readmission for magnesium sulfate and blood pressure control. Today, she reports that she feels overall well. Denies headache, changes in vision, chest pain, SOB, RUQ pain, or peripheral swelling.   History obtained from: patient    Postpartum Depression: Low Risk  (4/10/2025)    [place]  Depression Scale    • Last EPDS Total Score: 0    • Last EPDS Self Harm Result: Never         Review of Systems   All other systems reviewed and are negative.    Medical History Reviewed by provider this encounter:  Med Hx  Surg Hx  Fam Hx     .     Objective   /70 (BP Location: Left arm, Patient Position: Sitting, Cuff Size: Standard)   Ht 5' 5.25\" (1.657 m)   Wt 67.6 kg (149 lb)   LMP 2024 (Approximate)   Breastfeeding Yes   BMI 24.61 kg/m²      Physical Exam  Vitals reviewed.   Constitutional:       Appearance: Normal appearance.   Neurological:      General: No focal deficit present.      Mental Status: She is alert.   Psychiatric:         Mood and Affect: Mood normal.           "

## 2025-04-10 NOTE — ASSESSMENT & PLAN NOTE
- Blood pressure normal today. No signs/symptoms of worsening pre-eclampsia.   - Continue Procardia XL 60 mg PO daily and labetalol 200 mg PO BID.   - Continue home blood pressure monitoring. Parameters reviewed.

## 2025-04-11 ENCOUNTER — TELEPHONE (OUTPATIENT)
Dept: OBGYN CLINIC | Facility: CLINIC | Age: 30
End: 2025-04-11

## 2025-04-11 NOTE — TELEPHONE ENCOUNTER
POSTPARTUM PHONE CALL ASSESSMENT    Date of Delivery: 25  Delivering Provider: Dr. Connelly  Mode:   Delivery Notes/Complications: She was admitted at 38w2d for IOL for GHTN.   The  was admitted to NICU for respiratory support.  Otherwise, the post delivery course was unremarkable.  She was started on Procardia XL 30mg qDay with good BP control thereafter. Her postpartum course was complicated by pre-eclampsia with severe features requiring readmission for magnesium sulfate and blood pressure control.      Do you still have bleeding/pain? If so, how much/how severe? She states that she still having some bleeding but not heavy. Denies clots. She still having some discomfort but not painful.      Regular BMs/Urination? Yes normal BM and no difficulty urinating.     Breastfeeding/Formula/Both? Breastfeeding/pumping    How are you doing emotionally? She states overall she feel good.    If struggling, obtain a EPDS Score: N/A    Do you have any other questions or concerns for us or your provider? None    Have you scheduled the pediatrician appointment with pediatrician? Yes    Do you have a postpartum visit scheduled? yes   Date scheduled: 25 Provider: Dr. Whalen

## 2025-04-14 RX ORDER — NIFEDIPINE 30 MG
60 TABLET, EXTENDED RELEASE ORAL DAILY
Qty: 60 TABLET | Refills: 0 | Status: SHIPPED | OUTPATIENT
Start: 2025-04-14

## 2025-04-24 ENCOUNTER — POSTPARTUM VISIT (OUTPATIENT)
Dept: OBGYN CLINIC | Facility: CLINIC | Age: 30
End: 2025-04-24

## 2025-04-24 VITALS
WEIGHT: 147 LBS | BODY MASS INDEX: 24.49 KG/M2 | SYSTOLIC BLOOD PRESSURE: 106 MMHG | DIASTOLIC BLOOD PRESSURE: 62 MMHG | HEIGHT: 65 IN

## 2025-04-24 PROCEDURE — 99024 POSTOP FOLLOW-UP VISIT: CPT | Performed by: STUDENT IN AN ORGANIZED HEALTH CARE EDUCATION/TRAINING PROGRAM

## 2025-04-24 NOTE — ASSESSMENT & PLAN NOTE
- Continue Procardia XL 30 mg PO daily and labetalol 200 mg PO BID for 6 weeks postpartum. Discussed that at 6 weeks, can discontinue labetalol. Continue checking BP at home daily and send BP log to me for review 7 days after discontinuing labetalol. Will review and titrate medications further at that time.

## 2025-04-24 NOTE — PROGRESS NOTES
Name: Fady Molina      : 1995      MRN: [id number]  Encounter Provider: Simon Whalen MD  Encounter Date: 2025   Encounter department: [facility] [place]  :  Assessment & Plan  Postpartum care following vaginal delivery  - Normal postpartum exam  - Contraception: condoms for now. Considering Mirena. May call to schedule insertion visit.  - Depression Screen: Low risk  - Feeding: Breast  - Cervical cancer screening Up to Date  - Referred to Physical Therapy for routine postpartum evaluation and treatment.  - Follow up in 3 months for annual exam or as needed.  Orders:  •  Ambulatory Referral to Physical Therapy; Future    Preeclampsia in postpartum period  - Continue Procardia XL 30 mg PO daily and labetalol 200 mg PO BID for 6 weeks postpartum. Discussed that at 6 weeks, can discontinue labetalol. Continue checking BP at home daily and send BP log to me for review 7 days after discontinuing labetalol. Will review and titrate medications further at that time.            History of Present Illness   HPI  Fady Molina is a 30 y.o. female who presents for routine postpartum follow up visit. She is 3 weeks s/p spontaneous vaginal delivery on 2025 at 38w2d. Her postpartum course has been complicated by pre-eclampsia with severe features requiring readmission. She reports that she has been recovering well. Denies headache, changes in vision, chest pain, SOB, RUQ pain, or peripheral swelling. Thin lochia. Denies issues with bowel/bladder.  History obtained from: patient    Postpartum Depression: Low Risk  (2025)    [place]  Depression Scale    • Last EPDS Total Score: 0    • Last EPDS Self Harm Result: Never     Review of Systems   All other systems reviewed and are negative.    Medical History Reviewed by provider this encounter:  Tobacco  Med Hx  Surg Hx  Fam Hx  Soc Hx    .     Objective   /62 (BP Location: Left arm, Patient  "Position: Sitting, Cuff Size: Standard)   Ht 5' 5.25\" (1.657 m)   Wt 66.7 kg (147 lb)   LMP 07/13/2024 (Approximate)   Breastfeeding Yes Comment: Mainly pumping  BMI 24.27 kg/m²      Physical Exam  Vitals reviewed. Exam conducted with a chaperone present (Fartun Russ MA).   Constitutional:       Appearance: Normal appearance.   Cardiovascular:      Rate and Rhythm: Normal rate.   Pulmonary:      Effort: Pulmonary effort is normal.   Genitourinary:     Exam position: Lithotomy position.      Labia:         Right: No rash or tenderness.         Left: No rash or tenderness.       Vagina: Normal. No vaginal discharge, erythema, tenderness, bleeding, lesions or prolapsed vaginal walls.      Cervix: Normal. No cervical motion tenderness, discharge, friability, lesion, erythema or cervical bleeding.      Uterus: Normal.       Adnexa: Right adnexa normal and left adnexa normal.        Right: No mass, tenderness or fullness.          Left: No mass, tenderness or fullness.        Comments: Long suture tail x 2 noted at obstetric laceration. Excess suture trimmed flush with tissue using sterile scissors.   Neurological:      General: No focal deficit present.      Mental Status: She is alert and oriented to person, place, and time.   Psychiatric:         Mood and Affect: Mood normal.         Behavior: Behavior normal.         Thought Content: Thought content normal.         Judgment: Judgment normal.         "

## 2025-04-24 NOTE — PATIENT INSTRUCTIONS
At 6 weeks postpartum, STOP labetalol. Check blood pressure daily for 1 week, then send BP log to me via [employer] for review. If blood pressures remain normal, will then plan to stop Procardia and repeat BP monitoring for another week.

## 2025-04-27 ENCOUNTER — PATIENT MESSAGE (OUTPATIENT)
Dept: OBGYN CLINIC | Facility: CLINIC | Age: 30
End: 2025-04-27

## 2025-04-28 RX ORDER — LABETALOL 200 MG/1
200 TABLET, FILM COATED ORAL EVERY 12 HOURS SCHEDULED
Qty: 60 TABLET | Refills: 0 | Status: SHIPPED | OUTPATIENT
Start: 2025-04-28

## 2025-05-02 ENCOUNTER — OFFICE VISIT (OUTPATIENT)
Dept: FAMILY MEDICINE CLINIC | Facility: CLINIC | Age: 30
End: 2025-05-02
Payer: COMMERCIAL

## 2025-05-02 VITALS
DIASTOLIC BLOOD PRESSURE: 74 MMHG | HEIGHT: 65 IN | HEART RATE: 84 BPM | OXYGEN SATURATION: 99 % | BODY MASS INDEX: 24.59 KG/M2 | WEIGHT: 147.6 LBS | SYSTOLIC BLOOD PRESSURE: 111 MMHG | TEMPERATURE: 97.4 F

## 2025-05-02 DIAGNOSIS — Z13.0 SCREENING FOR DEFICIENCY ANEMIA: ICD-10-CM

## 2025-05-02 DIAGNOSIS — Z13.6 SCREENING FOR CARDIOVASCULAR CONDITION: ICD-10-CM

## 2025-05-02 DIAGNOSIS — Z13.29 SCREENING FOR THYROID DISORDER: ICD-10-CM

## 2025-05-02 DIAGNOSIS — R05.3 CHRONIC COUGH: ICD-10-CM

## 2025-05-02 DIAGNOSIS — Z00.00 ANNUAL PHYSICAL EXAM: Primary | ICD-10-CM

## 2025-05-02 DIAGNOSIS — Z13.1 SCREENING FOR DIABETES MELLITUS: ICD-10-CM

## 2025-05-02 PROCEDURE — 99385 PREV VISIT NEW AGE 18-39: CPT | Performed by: FAMILY MEDICINE

## 2025-05-02 RX ORDER — FLUTICASONE PROPIONATE 250 UG/1
1 POWDER, METERED RESPIRATORY (INHALATION) 2 TIMES DAILY
Qty: 60 BLISTER | Refills: 0 | Status: SHIPPED | OUTPATIENT
Start: 2025-05-02 | End: 2025-06-01

## 2025-05-02 NOTE — PATIENT INSTRUCTIONS
"Patient Education     Routine physical for adults   The Basics   Written by the doctors and editors at [facility]   What is a physical? -- A physical is a routine visit, or \"check-up,\" with your doctor. You might also hear it called a \"wellness visit\" or \"preventive visit.\"  During each visit, the doctor will:   Ask about your physical and mental health   Ask about your habits, behaviors, and lifestyle   Do an exam   Give you vaccines if needed   Talk to you about any medicines you take   Give advice about your health   Answer your questions  Getting regular check-ups is an important part of taking care of your health. It can help your doctor find and treat any problems you have. But it's also important for preventing health problems.  A routine physical is different from a \"sick visit.\" A sick visit is when you see a doctor because of a health concern or problem. Since physicals are scheduled ahead of time, you can think about what you want to ask the doctor.  How often should I get a physical? -- It depends on your age and health. In general, for people age 21 years and older:   If you are younger than 50 years, you might be able to get a physical every 3 years.   If you are 50 years or older, your doctor might recommend a physical every year.  If you have an ongoing health condition, like diabetes or high blood pressure, your doctor will probably want to see you more often.  What happens during a physical? -- In general, each visit will include:   Physical exam - The doctor or nurse will check your height, weight, heart rate, and blood pressure. They will also look at your eyes and ears. They will ask about how you are feeling and whether you have any symptoms that bother you.   Medicines - It's a good idea to bring a list of all the medicines you take to each doctor visit. Your doctor will talk to you about your medicines and answer any questions. Tell them if you are having any side effects that bother you. You " "should also tell them if you are having trouble paying for any of your medicines.   Habits and behaviors - This includes:   Your diet   Your exercise habits   Whether you smoke, drink alcohol, or use drugs   Whether you are sexually active   Whether you feel safe at home  Your doctor will talk to you about things you can do to improve your health and lower your risk of health problems. They will also offer help and support. For example, if you want to quit smoking, they can give you advice and might prescribe medicines. If you want to improve your diet or get more physical activity, they can help you with this, too.   Lab tests, if needed - The tests you get will depend on your age and situation. For example, your doctor might want to check your:   Cholesterol   Blood sugar   Iron level   Vaccines - The recommended vaccines will depend on your age, health, and what vaccines you already had. Vaccines are very important because they can prevent certain serious or deadly infections.   Discussion of screening - \"Screening\" means checking for diseases or other health problems before they cause symptoms. Your doctor can recommend screening based on your age, risk, and preferences. This might include tests to check for:   Cancer, such as breast, prostate, cervical, ovarian, colorectal, prostate, lung, or skin cancer   Sexually transmitted infections, such as chlamydia and gonorrhea   Mental health conditions like depression and anxiety  Your doctor will talk to you about the different types of screening tests. They can help you decide which screenings to have. They can also explain what the results might mean.   Answering questions - The physical is a good time to ask the doctor or nurse questions about your health. If needed, they can refer you to other doctors or specialists, too.  Adults older than 65 years often need other care, too. As you get older, your doctor will talk to you about:   How to prevent falling at " home   Hearing or vision tests   Memory testing   How to take your medicines safely   Making sure that you have the help and support you need at home  All topics are updated as new evidence becomes available and our peer review process is complete.  This topic retrieved from [employer] on: May 02, 2024.  Topic 973606 Version 1.0  Release: 32.4.3 - C32.122  © 2024 UpToDate, [employer] and/or its affiliates. All rights reserved.  Consumer Information Use and Disclaimer   Disclaimer: This generalized information is a limited summary of diagnosis, treatment, and/or medication information. It is not meant to be comprehensive and should be used as a tool to help the user understand and/or assess potential diagnostic and treatment options. It does NOT include all information about conditions, treatments, medications, side effects, or risks that may apply to a specific patient. It is not intended to be medical advice or a substitute for the medical advice, diagnosis, or treatment of a health care provider based on the health care provider's examination and assessment of a patient's specific and unique circumstances. Patients must speak with a health care provider for complete information about their health, medical questions, and treatment options, including any risks or benefits regarding use of medications. This information does not endorse any treatments or medications as safe, effective, or approved for treating a specific patient. Up[employer], Inc[employer] and its affiliates disclaim any warranty or liability relating to this information or the use thereof.The use of this information is governed by the Terms of Use, available at https://www.wolterstreadalonguwer.com/en/know/clinical-effectiveness-terms. 2024© UpToDate, Inc. and its affiliates and/or licensors. All rights reserved.  Copyright   © 2024 Up[employer], Inc[employer] and/or its affiliates. All rights reserved.

## 2025-05-02 NOTE — PROGRESS NOTES
Adult Annual Physical  Name: Fady Molina      : 1995      MRN: [id number]  Encounter Provider: Jeana Whalen DO  Encounter Date: 2025   Encounter department: [facility]    :  Assessment & Plan  Annual physical exam  Discussed diet and exercise  Screening labs ordered  Adacel up to date  Cervical cancer screen up to date       Chronic cough  Persists from URI back in January  Lungs clear on exam.  ? Post infectious vs secondary to post nasal drainage vs secondary to reflux.   Will check cxr  Trial of flovent bid. Rinse mouth after use,   Call or message in 3 weeks with update.   Orders:    XR chest pa and lateral    fluticasone (Flovent Diskus) 250 mcg/actuation diskus inhaler; Inhale 1 puff 2 (two) times a day Rinse mouth after use.    Screening for diabetes mellitus    Orders:    Comprehensive metabolic panel    Screening for cardiovascular condition    Orders:    Lipid panel    Screening for thyroid disorder    Orders:    TSH, 3rd generation with Free T4 reflex    Screening for deficiency anemia    Orders:    CBC and differential    Annual physical exam               Preventive Screenings:    - Cervical cancer screening: screening up-to-date          History of Present Illness     Adult Annual Physical:  Patient presents for annual physical.     Diet and Physical Activity:  - Diet/Nutrition: no special diet.  - Exercise:. lifiting and cardio prior to baby ;currently walking    Depression Screening:  - PHQ-2 Score: 0    General Health:  - Sleep: sleeps well.  - Hearing: normal hearing right ear and normal hearing left ear.  - Vision: no vision problems.  - Dental: regular dental visits.    /GYN Health:  - Follows with GYN: yes.   - Menopause: premenopausal.     Patient is a 30 year old female who is being seen today as a new patient for annual physical.   Cervical cancer screen up to date (23)  Adacel up to date.     Had preeclampsia with recent  "pregnancy. Is 4 weeks post partum.   On labetalol and nifedipine.   Saw gynecology earlier this month who advised \"Continue Procardia XL 30 mg PO daily and labetalol 200 mg PO BID for 6 weeks postpartum. Discussed that at 6 weeks, can discontinue labetalol. Continue checking BP at home daily and send BP log to me for review 7 days after discontinuing labetalol. \"    She had a respiratory infection in January and notes that she has been coughing ever since then. Cough is dry. Worse when she first lies down.   No rhinorrhea or nasal congestion  Maybe post nasal drip.   No shortness of breath or wheeze.   Did have some heartburn towards end of pregnancy but that is better.         Review of Systems   Constitutional:  Negative for chills and fever.   HENT:  Negative for ear pain, postnasal drip, rhinorrhea and sore throat.    Respiratory:  Positive for cough. Negative for shortness of breath and wheezing.    Cardiovascular:  Negative for chest pain.   Musculoskeletal:  Negative for myalgias.   Skin:  Negative for rash.   Neurological:  Negative for headaches.         Objective   /74 (BP Location: Left arm, Patient Position: Sitting, Cuff Size: Standard)   Pulse 84   Temp (!) 97.4 °F (36.3 °C) (Tympanic)   Ht 5' 5.25\" (1.657 m)   Wt 67 kg (147 lb 9.6 oz)   LMP 07/13/2024 (Approximate)   SpO2 99%   BMI 24.37 kg/m²     Physical Exam  Vitals and nursing note reviewed.   Constitutional:       General: She is not in acute distress.     Appearance: Normal appearance. She is not ill-appearing, toxic-appearing or diaphoretic.   HENT:      Head: Normocephalic and atraumatic.      Right Ear: Tympanic membrane normal.      Left Ear: Tympanic membrane normal.      Nose: Nose normal.      Mouth/Throat:      Mouth: Mucous membranes are moist.      Pharynx: No posterior oropharyngeal erythema.   Eyes:      Extraocular Movements: Extraocular movements intact.      Conjunctiva/sclera: Conjunctivae normal.      Pupils: Pupils " are equal, round, and reactive to light.   Cardiovascular:      Rate and Rhythm: Normal rate and regular rhythm.      Heart sounds: No murmur heard.  Pulmonary:      Effort: Pulmonary effort is normal.      Breath sounds: Normal breath sounds.   Abdominal:      General: Abdomen is flat. Bowel sounds are normal.      Palpations: Abdomen is soft.   Musculoskeletal:      Cervical back: Normal range of motion and neck supple.      Right lower leg: No edema.      Left lower leg: No edema.   Lymphadenopathy:      Cervical: No cervical adenopathy.   Skin:     Findings: No rash.   Neurological:      General: No focal deficit present.      Mental Status: She is alert and oriented to person, place, and time.      Deep Tendon Reflexes: Reflexes normal.   Psychiatric:         Mood and Affect: Mood normal.

## 2025-05-05 ENCOUNTER — DOCUMENTATION (OUTPATIENT)
Dept: OBGYN CLINIC | Facility: CLINIC | Age: 30
End: 2025-05-05

## 2025-05-06 ENCOUNTER — RESULTS FOLLOW-UP (OUTPATIENT)
Dept: FAMILY MEDICINE CLINIC | Facility: CLINIC | Age: 30
End: 2025-05-06

## 2025-05-06 DIAGNOSIS — R79.89 LOW TSH LEVEL: Primary | ICD-10-CM

## 2025-05-06 PROBLEM — E78.2 MIXED HYPERLIPIDEMIA: Status: ACTIVE | Noted: 2025-05-06

## 2025-05-06 LAB
ALBUMIN SERPL-MCNC: 4.4 G/DL (ref 3.9–4.9)
ALP SERPL-CCNC: 99 IU/L (ref 44–121)
ALT SERPL-CCNC: 12 IU/L (ref 0–32)
AST SERPL-CCNC: 17 IU/L (ref 0–40)
BASOPHILS # BLD AUTO: 0.1 X10E3/UL (ref 0–0.2)
BASOPHILS NFR BLD AUTO: 1 %
BILIRUB SERPL-MCNC: 0.3 MG/DL (ref 0–1.2)
BUN SERPL-MCNC: 12 MG/DL (ref 6–20)
BUN/CREAT SERPL: 17 (ref 9–23)
CALCIUM SERPL-MCNC: 9.2 MG/DL (ref 8.7–10.2)
CHLORIDE SERPL-SCNC: 105 MMOL/L (ref 96–106)
CHOLEST SERPL-MCNC: 240 MG/DL (ref 100–199)
CHOLEST/HDLC SERPL: 4.1 RATIO (ref 0–4.4)
CO2 SERPL-SCNC: 17 MMOL/L (ref 20–29)
CREAT SERPL-MCNC: 0.72 MG/DL (ref 0.57–1)
EGFR: 114 ML/MIN/1.73
EOSINOPHIL # BLD AUTO: 0.7 X10E3/UL (ref 0–0.4)
EOSINOPHIL NFR BLD AUTO: 11 %
ERYTHROCYTE [DISTWIDTH] IN BLOOD BY AUTOMATED COUNT: 11.7 % (ref 11.7–15.4)
GLOBULIN SER-MCNC: 2.2 G/DL (ref 1.5–4.5)
GLUCOSE SERPL-MCNC: 67 MG/DL (ref 70–99)
HCT VFR BLD AUTO: 41.6 % (ref 34–46.6)
HDLC SERPL-MCNC: 58 MG/DL
HGB BLD-MCNC: 13.8 G/DL (ref 11.1–15.9)
IMM GRANULOCYTES # BLD: 0 X10E3/UL (ref 0–0.1)
IMM GRANULOCYTES NFR BLD: 0 %
LDLC SERPL CALC-MCNC: 148 MG/DL (ref 0–99)
LYMPHOCYTES # BLD AUTO: 2.1 X10E3/UL (ref 0.7–3.1)
LYMPHOCYTES NFR BLD AUTO: 32 %
MCH RBC QN AUTO: 30.2 PG (ref 26.6–33)
MCHC RBC AUTO-ENTMCNC: 33.2 G/DL (ref 31.5–35.7)
MCV RBC AUTO: 91 FL (ref 79–97)
MONOCYTES # BLD AUTO: 0.4 X10E3/UL (ref 0.1–0.9)
MONOCYTES NFR BLD AUTO: 5 %
NEUTROPHILS # BLD AUTO: 3.4 X10E3/UL (ref 1.4–7)
NEUTROPHILS NFR BLD AUTO: 51 %
PLATELET # BLD AUTO: 280 X10E3/UL (ref 150–450)
POTASSIUM SERPL-SCNC: 4.2 MMOL/L (ref 3.5–5.2)
PROT SERPL-MCNC: 6.6 G/DL (ref 6–8.5)
RBC # BLD AUTO: 4.57 X10E6/UL (ref 3.77–5.28)
SL AMB VLDL CHOLESTEROL CALC: 34 MG/DL (ref 5–40)
SODIUM SERPL-SCNC: 143 MMOL/L (ref 134–144)
TRIGL SERPL-MCNC: 189 MG/DL (ref 0–149)
TSH SERPL DL<=0.005 MIU/L-ACNC: 0.17 UIU/ML (ref 0.45–4.5)
WBC # BLD AUTO: 6.7 X10E3/UL (ref 3.4–10.8)

## 2025-05-13 ENCOUNTER — APPOINTMENT (OUTPATIENT)
Dept: RADIOLOGY | Facility: CLINIC | Age: 30
End: 2025-05-13
Attending: FAMILY MEDICINE
Payer: COMMERCIAL

## 2025-05-13 ENCOUNTER — HOSPITAL ENCOUNTER (OUTPATIENT)
Dept: ULTRASOUND IMAGING | Facility: CLINIC | Age: 30
Discharge: HOME | End: 2025-05-13
Payer: COMMERCIAL

## 2025-05-13 DIAGNOSIS — R79.89 LOW TSH LEVEL: ICD-10-CM

## 2025-05-13 PROCEDURE — 71046 X-RAY EXAM CHEST 2 VIEWS: CPT

## 2025-05-13 PROCEDURE — 76536 US EXAM OF HEAD AND NECK: CPT

## 2025-05-15 ENCOUNTER — PROCEDURE VISIT (OUTPATIENT)
Dept: OBGYN CLINIC | Facility: CLINIC | Age: 30
End: 2025-05-15
Payer: COMMERCIAL

## 2025-05-15 VITALS
HEIGHT: 65 IN | BODY MASS INDEX: 23.82 KG/M2 | SYSTOLIC BLOOD PRESSURE: 112 MMHG | DIASTOLIC BLOOD PRESSURE: 78 MMHG | WEIGHT: 143 LBS

## 2025-05-15 DIAGNOSIS — Z32.02 NEGATIVE PREGNANCY TEST: ICD-10-CM

## 2025-05-15 DIAGNOSIS — Z11.3 SCREENING EXAMINATION FOR STI: ICD-10-CM

## 2025-05-15 DIAGNOSIS — Z30.430 ENCOUNTER FOR INSERTION OF MIRENA IUD: Primary | ICD-10-CM

## 2025-05-15 LAB — SL AMB POCT URINE HCG: NORMAL

## 2025-05-15 PROCEDURE — 58300 INSERT INTRAUTERINE DEVICE: CPT | Performed by: STUDENT IN AN ORGANIZED HEALTH CARE EDUCATION/TRAINING PROGRAM

## 2025-05-15 PROCEDURE — 81025 URINE PREGNANCY TEST: CPT | Performed by: STUDENT IN AN ORGANIZED HEALTH CARE EDUCATION/TRAINING PROGRAM

## 2025-05-15 NOTE — PROGRESS NOTES
IUD Procedure    Date/Time: 5/15/2025 1:20 PM    Performed by: Simon Whalen MD  Authorized by: Simon Whalen MD    Other Assisting Provider: No    Written consent obtained?: Yes    Risks and benefits: Risks, benefits and alternatives were discussed    Consent given by:  Patient  Time Out:     Time out: Immediately prior to the procedure a time out was called    Patient states understanding of procedure being performed: Yes    Patient's understanding of procedure matches consent: Yes    Procedure consent matches procedure scheduled: Yes    Required items: Required blood products, implants, devices and special equipment available    Patient identity confirmed:  Verbally with patient  Select procedure: IUD insertion    IUD Insertion:     Pelvic exam performed: yes      Negative GC/chlamydia test: Swab collected prior to device insertion.      Negative urine pregnancy test: yes      Cervix cleaned and prepped: yes      Speculum placed in vagina: yes      Tenaculum applied to cervix: yes      IUD inserted with no complications: yes      Strings trimmed: yes      Uterus sounded: yes      Uterus sound depth (cm):  8    IUD type:  1 each Levonorgestrel 20 MCG/DAY  Post-procedure:     Patient tolerated procedure well: yes

## 2025-05-19 ENCOUNTER — PATIENT MESSAGE (OUTPATIENT)
Dept: OBGYN CLINIC | Facility: CLINIC | Age: 30
End: 2025-05-19

## 2025-05-19 ENCOUNTER — RESULTS FOLLOW-UP (OUTPATIENT)
Dept: FAMILY MEDICINE CLINIC | Facility: CLINIC | Age: 30
End: 2025-05-19

## 2025-05-19 ENCOUNTER — RESULTS FOLLOW-UP (OUTPATIENT)
Dept: OBGYN CLINIC | Facility: CLINIC | Age: 30
End: 2025-05-19

## 2025-05-19 LAB
C TRACH RRNA SPEC QL NAA+PROBE: NEGATIVE
N GONORRHOEA RRNA SPEC QL NAA+PROBE: NEGATIVE

## 2025-05-28 RX ORDER — LABETALOL 200 MG/1
200 TABLET, FILM COATED ORAL EVERY 12 HOURS
Qty: 180 TABLET | Refills: 1 | OUTPATIENT
Start: 2025-05-28

## 2025-06-18 LAB
THYROPEROXIDASE AB SERPL-ACNC: 28 IU/ML (ref 0–34)
TSH SERPL DL<=0.005 MIU/L-ACNC: 0.6 UIU/ML (ref 0.45–4.5)
TSI SER-ACNC: <0.1 IU/L (ref 0–0.55)